# Patient Record
Sex: FEMALE | Race: BLACK OR AFRICAN AMERICAN | NOT HISPANIC OR LATINO | URBAN - METROPOLITAN AREA
[De-identification: names, ages, dates, MRNs, and addresses within clinical notes are randomized per-mention and may not be internally consistent; named-entity substitution may affect disease eponyms.]

---

## 2017-01-02 ENCOUNTER — EMERGENCY (EMERGENCY)
Facility: HOSPITAL | Age: 22
LOS: 1 days | Discharge: HOME | End: 2017-01-02
Attending: EMERGENCY MEDICINE | Admitting: EMERGENCY MEDICINE
Payer: MEDICAID

## 2017-01-02 VITALS
OXYGEN SATURATION: 100 % | RESPIRATION RATE: 16 BRPM | HEART RATE: 80 BPM | SYSTOLIC BLOOD PRESSURE: 126 MMHG | DIASTOLIC BLOOD PRESSURE: 60 MMHG | TEMPERATURE: 98 F

## 2017-01-02 LAB
APPEARANCE UR: CLEAR — SIGNIFICANT CHANGE UP
BILIRUB UR-MCNC: NEGATIVE — SIGNIFICANT CHANGE UP
BLOOD UR QL VISUAL: HIGH
COLOR SPEC: YELLOW — SIGNIFICANT CHANGE UP
GLUCOSE UR-MCNC: NEGATIVE — SIGNIFICANT CHANGE UP
KETONES UR-MCNC: NEGATIVE — SIGNIFICANT CHANGE UP
LEUKOCYTE ESTERASE UR-ACNC: NEGATIVE — SIGNIFICANT CHANGE UP
NITRITE UR-MCNC: NEGATIVE — SIGNIFICANT CHANGE UP
PH UR: 7 — SIGNIFICANT CHANGE UP (ref 4.6–8)
PROT UR-MCNC: NEGATIVE — SIGNIFICANT CHANGE UP
RBC CASTS # UR COMP ASSIST: SIGNIFICANT CHANGE UP (ref 0–?)
SP GR SPEC: 1.02 — SIGNIFICANT CHANGE UP (ref 1–1.03)
SQUAMOUS # UR AUTO: SIGNIFICANT CHANGE UP
UROBILINOGEN FLD QL: NORMAL E.U. — SIGNIFICANT CHANGE UP (ref 0.1–0.2)
WBC UR QL: SIGNIFICANT CHANGE UP (ref 0–?)

## 2017-01-02 PROCEDURE — 99283 EMERGENCY DEPT VISIT LOW MDM: CPT

## 2017-01-02 RX ORDER — MORPHINE SULFATE 50 MG/1
4 CAPSULE, EXTENDED RELEASE ORAL ONCE
Qty: 0 | Refills: 0 | Status: DISCONTINUED | OUTPATIENT
Start: 2017-01-02 | End: 2017-01-02

## 2017-01-02 NOTE — ED PROVIDER NOTE - OBJECTIVE STATEMENT
22 y/o F pt w/ PMHx of Left ovarian cyst c/o vomiting (nonbloody) x3 days and suprapubic pain (pressure-like), increased urinary frequency and malodorous urine since yesterday. No changes in bowel habits. Also reports 1 day of abnormal vaginal spotting. No hx of STDs. Denies fever, chills, lightheadedness, diarrhea, cough, CP, SOB, rash or any other complaints. NKDA. LMP 12/16/16.

## 2017-01-02 NOTE — ED PROVIDER NOTE - NS ED MD SCRIBE ATTENDING SCRIBE SECTIONS
HISTORY OF PRESENT ILLNESS/DISPOSITION/PHYSICAL EXAM/VITAL SIGNS( Pullset)/PAST MEDICAL/SURGICAL/SOCIAL HISTORY/HIV/REVIEW OF SYSTEMS

## 2017-01-02 NOTE — ED PROVIDER NOTE - CHPI ED SYMPTOMS POS
VOMITING/suprapubic pain , increased urinary frequency , malodorous urine  and vaginal spotting/PAIN

## 2017-01-02 NOTE — ED PROVIDER NOTE - PROGRESS NOTE DETAILS
Pt reports feeling improved. Serial abd exam now benign.  Test results and dispo plan reviewed w/ patient.

## 2017-01-02 NOTE — ED ADULT TRIAGE NOTE - CHIEF COMPLAINT QUOTE
c/o lower abdominal pain with nausea and vomiting, since yesterday with vaginal spotting and now bleeding. Denies any diarrhea or fever. LMP:12/19/16.  PMH: ovarian cyst

## 2017-08-10 NOTE — ED PROVIDER NOTE - CPE EDP EYES NORM
August 10, 2017        Rose Conrad  [address]  [facility] [address]      Dear Rose,    I am writing to give you the results of recent testing that you had done. In this letter, the word normal means that the test result was acceptable and does not indicate any problem or illness. The word abnormal means that the test does not fit within the normal range and may indicate a problem.   Test results within acceptable range   Your cholesterol test shows borderline high cholesterol. Recommend to follow low fat diet, regular exercise and wt loss.         Return to clinic in  6 months retest fasting blood sample prior to appointment        If you have any further questions please call me at [phone].     Sincerely,      Nellie Holliday MD  [facility]  Internal Medicine  [facility]  [address] [place]  [address] [phone]    [employer] is a powerful new internet tool that is quick, convenient and confidential.  With [employer], you can view your results faster; communicate online with your [facility] physician's office, schedule many types of appointments, and find helpful healthcare links.    Visit [email or web address] soon and often.  Sign up, take a quick tour, view important information, and stay in touch with [facility].  We're there when you need us.     normal...

## 2018-09-06 ENCOUNTER — EMERGENCY (EMERGENCY)
Facility: HOSPITAL | Age: 23
LOS: 0 days | Discharge: HOME | End: 2018-09-06
Attending: EMERGENCY MEDICINE
Payer: MEDICAID

## 2018-09-06 VITALS
DIASTOLIC BLOOD PRESSURE: 76 MMHG | HEIGHT: 63 IN | HEART RATE: 105 BPM | OXYGEN SATURATION: 100 % | RESPIRATION RATE: 18 BRPM | TEMPERATURE: 103 F | SYSTOLIC BLOOD PRESSURE: 103 MMHG | WEIGHT: 233.91 LBS

## 2018-09-06 VITALS
TEMPERATURE: 98 F | DIASTOLIC BLOOD PRESSURE: 55 MMHG | HEART RATE: 75 BPM | RESPIRATION RATE: 18 BRPM | SYSTOLIC BLOOD PRESSURE: 121 MMHG | OXYGEN SATURATION: 100 %

## 2018-09-06 DIAGNOSIS — R50.9 FEVER, UNSPECIFIED: ICD-10-CM

## 2018-09-06 DIAGNOSIS — N39.0 URINARY TRACT INFECTION, SITE NOT SPECIFIED: ICD-10-CM

## 2018-09-06 DIAGNOSIS — R11.2 NAUSEA WITH VOMITING, UNSPECIFIED: ICD-10-CM

## 2018-09-06 PROBLEM — N83.202 UNSPECIFIED OVARIAN CYST, LEFT SIDE: Chronic | Status: ACTIVE | Noted: 2017-01-02

## 2018-09-06 LAB
ALBUMIN SERPL ELPH-MCNC: 2.5 G/DL — LOW (ref 3.3–5)
ALP SERPL-CCNC: 79 U/L — SIGNIFICANT CHANGE UP (ref 40–120)
ALT FLD-CCNC: 13 U/L — SIGNIFICANT CHANGE UP (ref 12–78)
ANION GAP SERPL CALC-SCNC: 8 MMOL/L — SIGNIFICANT CHANGE UP (ref 5–17)
APPEARANCE UR: ABNORMAL
AST SERPL-CCNC: 10 U/L — LOW (ref 15–37)
BILIRUB SERPL-MCNC: 0.5 MG/DL — SIGNIFICANT CHANGE UP (ref 0.2–1.2)
BILIRUB UR-MCNC: NEGATIVE — SIGNIFICANT CHANGE UP
BUN SERPL-MCNC: 7 MG/DL — SIGNIFICANT CHANGE UP (ref 7–23)
CALCIUM SERPL-MCNC: 8 MG/DL — LOW (ref 8.5–10.1)
CHLORIDE SERPL-SCNC: 107 MMOL/L — SIGNIFICANT CHANGE UP (ref 96–108)
CO2 SERPL-SCNC: 29 MMOL/L — SIGNIFICANT CHANGE UP (ref 22–31)
COLOR SPEC: YELLOW — SIGNIFICANT CHANGE UP
CREAT SERPL-MCNC: 0.69 MG/DL — SIGNIFICANT CHANGE UP (ref 0.5–1.3)
DIFF PNL FLD: ABNORMAL
GLUCOSE SERPL-MCNC: 130 MG/DL — HIGH (ref 70–99)
GLUCOSE UR QL: NEGATIVE MG/DL — SIGNIFICANT CHANGE UP
HCG SERPL-ACNC: <1 MIU/ML — SIGNIFICANT CHANGE UP
HCT VFR BLD CALC: 31.3 % — LOW (ref 34.5–45)
HGB BLD-MCNC: 9.6 G/DL — LOW (ref 11.5–15.5)
KETONES UR-MCNC: NEGATIVE — SIGNIFICANT CHANGE UP
LACTATE SERPL-SCNC: 0.9 MMOL/L — SIGNIFICANT CHANGE UP (ref 0.7–2)
LACTATE SERPL-SCNC: 3.3 MMOL/L — HIGH (ref 0.7–2)
LEUKOCYTE ESTERASE UR-ACNC: ABNORMAL
MAGNESIUM SERPL-MCNC: 1.9 MG/DL — SIGNIFICANT CHANGE UP (ref 1.6–2.6)
MCHC RBC-ENTMCNC: 24.7 PG — LOW (ref 27–34)
MCHC RBC-ENTMCNC: 30.7 GM/DL — LOW (ref 32–36)
MCV RBC AUTO: 80.7 FL — SIGNIFICANT CHANGE UP (ref 80–100)
NITRITE UR-MCNC: POSITIVE
NRBC # BLD: 0 /100 WBCS — SIGNIFICANT CHANGE UP (ref 0–0)
PH UR: 7 — SIGNIFICANT CHANGE UP (ref 5–8)
PLATELET # BLD AUTO: 352 K/UL — SIGNIFICANT CHANGE UP (ref 150–400)
POTASSIUM SERPL-MCNC: 3.4 MMOL/L — LOW (ref 3.5–5.3)
POTASSIUM SERPL-SCNC: 3.4 MMOL/L — LOW (ref 3.5–5.3)
PROT SERPL-MCNC: 7.3 GM/DL — SIGNIFICANT CHANGE UP (ref 6–8.3)
PROT UR-MCNC: 15 MG/DL
RBC # BLD: 3.88 M/UL — SIGNIFICANT CHANGE UP (ref 3.8–5.2)
RBC # FLD: 16.2 % — HIGH (ref 10.3–14.5)
SODIUM SERPL-SCNC: 144 MMOL/L — SIGNIFICANT CHANGE UP (ref 135–145)
SP GR SPEC: 1 — LOW (ref 1.01–1.02)
UROBILINOGEN FLD QL: 1 MG/DL
WBC # BLD: 9.02 K/UL — SIGNIFICANT CHANGE UP (ref 3.8–10.5)
WBC # FLD AUTO: 9.02 K/UL — SIGNIFICANT CHANGE UP (ref 3.8–10.5)

## 2018-09-06 PROCEDURE — 76830 TRANSVAGINAL US NON-OB: CPT | Mod: 26

## 2018-09-06 PROCEDURE — 99284 EMERGENCY DEPT VISIT MOD MDM: CPT | Mod: 25

## 2018-09-06 PROCEDURE — 76775 US EXAM ABDO BACK WALL LIM: CPT | Mod: 26

## 2018-09-06 RX ORDER — CIPROFLOXACIN LACTATE 400MG/40ML
500 VIAL (ML) INTRAVENOUS ONCE
Qty: 0 | Refills: 0 | Status: COMPLETED | OUTPATIENT
Start: 2018-09-06 | End: 2018-09-06

## 2018-09-06 RX ORDER — ACETAMINOPHEN 500 MG
975 TABLET ORAL ONCE
Qty: 0 | Refills: 0 | Status: COMPLETED | OUTPATIENT
Start: 2018-09-06 | End: 2018-09-06

## 2018-09-06 RX ORDER — IBUPROFEN 200 MG
1 TABLET ORAL
Qty: 20 | Refills: 0
Start: 2018-09-06 | End: 2018-09-10

## 2018-09-06 RX ORDER — FAMOTIDINE 10 MG/ML
20 INJECTION INTRAVENOUS ONCE
Qty: 0 | Refills: 0 | Status: COMPLETED | OUTPATIENT
Start: 2018-09-06 | End: 2018-09-06

## 2018-09-06 RX ORDER — PHENAZOPYRIDINE HCL 100 MG
1 TABLET ORAL
Qty: 6 | Refills: 0
Start: 2018-09-06 | End: 2018-09-07

## 2018-09-06 RX ORDER — METOCLOPRAMIDE HCL 10 MG
10 TABLET ORAL ONCE
Qty: 0 | Refills: 0 | Status: COMPLETED | OUTPATIENT
Start: 2018-09-06 | End: 2018-09-06

## 2018-09-06 RX ORDER — FAMOTIDINE 10 MG/ML
20 INJECTION INTRAVENOUS ONCE
Qty: 0 | Refills: 0 | Status: DISCONTINUED | OUTPATIENT
Start: 2018-09-06 | End: 2018-09-06

## 2018-09-06 RX ORDER — SODIUM CHLORIDE 9 MG/ML
1000 INJECTION INTRAMUSCULAR; INTRAVENOUS; SUBCUTANEOUS ONCE
Qty: 0 | Refills: 0 | Status: COMPLETED | OUTPATIENT
Start: 2018-09-06 | End: 2018-09-06

## 2018-09-06 RX ORDER — MOXIFLOXACIN HYDROCHLORIDE TABLETS, 400 MG 400 MG/1
1 TABLET, FILM COATED ORAL
Qty: 20 | Refills: 0
Start: 2018-09-06 | End: 2018-09-15

## 2018-09-06 RX ADMIN — FAMOTIDINE 20 MILLIGRAM(S): 10 INJECTION INTRAVENOUS at 08:46

## 2018-09-06 RX ADMIN — FAMOTIDINE 104 MILLIGRAM(S): 10 INJECTION INTRAVENOUS at 08:16

## 2018-09-06 RX ADMIN — Medication 975 MILLIGRAM(S): at 08:15

## 2018-09-06 RX ADMIN — Medication 500 MILLIGRAM(S): at 15:51

## 2018-09-06 RX ADMIN — Medication 10 MILLIGRAM(S): at 11:31

## 2018-09-06 RX ADMIN — SODIUM CHLORIDE 1000 MILLILITER(S): 9 INJECTION INTRAMUSCULAR; INTRAVENOUS; SUBCUTANEOUS at 08:46

## 2018-09-06 RX ADMIN — SODIUM CHLORIDE 1000 MILLILITER(S): 9 INJECTION INTRAMUSCULAR; INTRAVENOUS; SUBCUTANEOUS at 08:16

## 2018-09-06 NOTE — ED PROVIDER NOTE - CARE PLAN
Principal Discharge DX:	Lower abdominal pain Principal Discharge DX:	Urinary tract infection without hematuria, site unspecified

## 2018-09-06 NOTE — ED PROVIDER NOTE - PHYSICAL EXAMINATION
Vitals: WNL  Gen: AAOx3, NAD, sitting comfortably in stretcher  Head: ncat, perrla, eomi b/l  Neck: supple, no lymphadenopathy, no midline deviation  Heart: rrr, no m/r/g  Lungs: CTA b/l, no rales/ronchi/wheezes  Abd: soft, suprapubic tenderness, non-distended, no rebound or guarding  Ext: no clubbing/cyanosis/edema  Neuro: sensation and muscle strength intact b/l, steady gait Vitals: WNL  Gen: AAOx3, NAD, sitting comfortably in stretcher  Head: ncat, perrla, eomi b/l  Neck: supple, no lymphadenopathy, no midline deviation  Heart: rrr, no m/r/g  Lungs: CTA b/l, no rales/ronchi/wheezes  Abd: soft, suprapubic tenderness, non-distended, no rebound or guarding  Ext: no clubbing/cyanosis/edema  Neuro: sensation and muscle strength intact b/l, steady gait  Pelvic: performed with PCT Guerline chaperone in ED; external genitalia normal, no lesions, vaginal canal has + dark blood pooling in vaginal vault, no discharge, normal cervix, no CMT, cervix closed, no adnexal tenderness, no masses palpated

## 2018-09-06 NOTE — ED PROVIDER NOTE - PROGRESS NOTE DETAILS
Results reported to patient--grossly benign, US and Labs WNL, + UTI on UA, likely complicated   Pt. reports feeling better after meds  pt. agrees to f/u with primary care outpt.  pt. understands to return to ED if symptoms worsen; will d/c with antbx

## 2018-09-06 NOTE — ED PROVIDER NOTE - OBJECTIVE STATEMENT
23 yo F with shaking chills on waking this morning.  Pt. reports nausea, 1 episode of vomiting after waking this morning.  She felt fine last night.  She denies sick contacts.  When asked, she admits to frequent BV, and suprapubic discomfort.  She notes her vaginal fluids have been different than normal.  When asked, she admits to back pain lately, no apparent reason.  No other complaints or associated symptoms.   ROS: negative for cough, headache, chest pain, shortness of breath, abd pain, nausea, vomiting, diarrhea, rash, paresthesia, and weakness--all other systems reviewed are negative.   PMH: BV; Meds: Denies; SH: Denies smoking/drinking/drug use, currently menstruating

## 2018-09-06 NOTE — ED ADULT NURSE NOTE - OBJECTIVE STATEMENT
Patient has abdominal pain, fever. pt states she was shaking and complains of lower back pain. Patient has abdominal pain, fever/chills, pt states she was shaking and complains of lower back pain. Hx of Bacterial vaginal cyst.

## 2018-09-06 NOTE — ED PROVIDER NOTE - MEDICAL DECISION MAKING DETAILS
21 yo F with lower abd pain, concerning for pregnancy, UTI, pyelo, renal stone, vaginal infection, viral gastroenteritis   -basic labs, mag, hcg, ua, cx, Tx pepcid, reglan, ns hydration, tylenol for fever

## 2019-04-24 PROBLEM — Z00.00 ENCOUNTER FOR PREVENTIVE HEALTH EXAMINATION: Status: ACTIVE | Noted: 2019-04-24

## 2019-05-31 ENCOUNTER — APPOINTMENT (OUTPATIENT)
Dept: SURGERY | Facility: CLINIC | Age: 24
End: 2019-05-31
Payer: MEDICAID

## 2019-05-31 VITALS
WEIGHT: 234 LBS | SYSTOLIC BLOOD PRESSURE: 122 MMHG | HEIGHT: 63 IN | TEMPERATURE: 98.2 F | DIASTOLIC BLOOD PRESSURE: 85 MMHG | OXYGEN SATURATION: 98 % | HEART RATE: 89 BPM | RESPIRATION RATE: 15 BRPM | BODY MASS INDEX: 41.46 KG/M2

## 2019-05-31 DIAGNOSIS — D64.9 ANEMIA, UNSPECIFIED: ICD-10-CM

## 2019-05-31 DIAGNOSIS — Z82.49 FAMILY HISTORY OF ISCHEMIC HEART DISEASE AND OTHER DISEASES OF THE CIRCULATORY SYSTEM: ICD-10-CM

## 2019-05-31 DIAGNOSIS — Z83.3 FAMILY HISTORY OF DIABETES MELLITUS: ICD-10-CM

## 2019-05-31 DIAGNOSIS — Z87.891 PERSONAL HISTORY OF NICOTINE DEPENDENCE: ICD-10-CM

## 2019-05-31 DIAGNOSIS — Z78.9 OTHER SPECIFIED HEALTH STATUS: ICD-10-CM

## 2019-05-31 PROCEDURE — 99205 OFFICE O/P NEW HI 60 MIN: CPT

## 2019-05-31 NOTE — ASSESSMENT
[FreeTextEntry1] : 23-year-old female followed with 3 234 pounds with a BMI of 41.5 she has been heavy most of her adult life she would like to be considered for a sleeve gastrectomy I believe she would be an excellent candidate she will undergo her usual medical nutritional and psychological workup attempted preoperative support group meeting and return for a second office visit one to lesion completely the risks benefits and expectations were discussed at length with the patient all of her questions were answered

## 2019-05-31 NOTE — CONSULT LETTER
[Dear  ___] : Dear  [unfilled], [Consult Letter:] : I had the pleasure of evaluating your patient, [unfilled]. [Please see my note below.] : Please see my note below. [Consult Closing:] : Thank you very much for allowing me to participate in the care of this patient.  If you have any questions, please do not hesitate to contact me. [Sincerely,] : Sincerely, [FreeTextEntry3] : Fabian Garcia MD, FACS, FASMBS\par Chief Division of Minimally Invasive Surgery\par Co-Director of Bariatric Surgery \par [facility]\[place] [address]

## 2019-05-31 NOTE — PHYSICAL EXAM
[Obese, well nourished, in no acute distress] : obese, well nourished, in no acute distress [Normal] : normal spine exam without palpable tenderness, no kyphosis or scoliosis

## 2019-05-31 NOTE — HISTORY OF PRESENT ILLNESS
[de-identified] : Sana is a 22 y/o female here for an initial consult for weight loss surgery.23-year-old female thought with 3 234 pounds BMI 41.5 she has been having most of her adult life she has been on multiple diet programs in the past closing up to 20 pounds of any one time only skin and that weight back she is looking for more permanent solution for her weight loss problem she is interested in a sleeve gastrectomy

## 2019-05-31 NOTE — REASON FOR VISIT
[Initial Consult] : an initial consult for [Morbid Obesity (BMI>40)] : morbid obesity (bmi>40) [FreeTextEntry2] : chief complaint morbid obesity

## 2019-06-14 ENCOUNTER — TRANSCRIPTION ENCOUNTER (OUTPATIENT)
Age: 24
End: 2019-06-14

## 2019-10-01 ENCOUNTER — APPOINTMENT (OUTPATIENT)
Dept: CARDIOLOGY | Facility: CLINIC | Age: 24
End: 2019-10-01
Payer: MEDICAID

## 2019-10-01 ENCOUNTER — NON-APPOINTMENT (OUTPATIENT)
Age: 24
End: 2019-10-01

## 2019-10-01 VITALS
SYSTOLIC BLOOD PRESSURE: 124 MMHG | HEART RATE: 80 BPM | HEIGHT: 63 IN | RESPIRATION RATE: 16 BRPM | BODY MASS INDEX: 41.11 KG/M2 | WEIGHT: 232 LBS | DIASTOLIC BLOOD PRESSURE: 72 MMHG

## 2019-10-01 PROCEDURE — 99204 OFFICE O/P NEW MOD 45 MIN: CPT

## 2019-10-01 PROCEDURE — 93000 ELECTROCARDIOGRAM COMPLETE: CPT

## 2019-10-03 ENCOUNTER — RESULT REVIEW (OUTPATIENT)
Age: 24
End: 2019-10-03

## 2019-10-04 RX ORDER — FERROUS SULFATE 325(65) MG
325 (65 FE) TABLET ORAL
Refills: 0 | Status: DISCONTINUED | COMMUNITY
End: 2019-10-04

## 2019-10-07 ENCOUNTER — APPOINTMENT (OUTPATIENT)
Dept: CARDIOLOGY | Facility: CLINIC | Age: 24
End: 2019-10-07
Payer: MEDICAID

## 2019-10-07 VITALS
HEIGHT: 63 IN | WEIGHT: 232 LBS | BODY MASS INDEX: 41.11 KG/M2 | RESPIRATION RATE: 15 BRPM | DIASTOLIC BLOOD PRESSURE: 80 MMHG | SYSTOLIC BLOOD PRESSURE: 128 MMHG | HEART RATE: 78 BPM

## 2019-10-07 DIAGNOSIS — R01.1 CARDIAC MURMUR, UNSPECIFIED: ICD-10-CM

## 2019-10-07 DIAGNOSIS — R06.09 OTHER FORMS OF DYSPNEA: ICD-10-CM

## 2019-10-07 DIAGNOSIS — Z01.818 ENCOUNTER FOR OTHER PREPROCEDURAL EXAMINATION: ICD-10-CM

## 2019-10-07 PROCEDURE — 99213 OFFICE O/P EST LOW 20 MIN: CPT | Mod: 25

## 2019-10-07 PROCEDURE — 93015 CV STRESS TEST SUPVJ I&R: CPT

## 2019-10-07 PROCEDURE — 93306 TTE W/DOPPLER COMPLETE: CPT

## 2019-10-07 PROCEDURE — ZZZZZ: CPT

## 2019-10-17 DIAGNOSIS — E55.9 VITAMIN D DEFICIENCY, UNSPECIFIED: ICD-10-CM

## 2019-10-17 DIAGNOSIS — E53.8 DEFICIENCY OF OTHER SPECIFIED B GROUP VITAMINS: ICD-10-CM

## 2019-10-17 DIAGNOSIS — E61.1 IRON DEFICIENCY: ICD-10-CM

## 2019-10-17 PROBLEM — R06.09 DYSPNEA ON EXERTION: Status: ACTIVE | Noted: 2019-10-01

## 2019-10-17 PROBLEM — R01.1 HEART MURMUR: Status: ACTIVE | Noted: 2019-10-01

## 2019-10-25 NOTE — ED ADULT NURSE NOTE - NSSISCREENINGQ4_ED_A_ED
Therapy Activity Session  Performed by Rehab Aide staff     MAP: Acute Mobility Activity Program activity plan was established by a licensed therapist and performed under the guidance of Nursing staff.      Pt seen on 3CD nursing unit                                                                                         PT Frequency Frequency Comments: X MTTh; MAP 1 Daily                                                                                  OT Frequency Frequency Comments: if (gap) MWF    SLP Frequency      Availability:  Attempted to work with patient this date, unable to due to  patient declining to participate following explaination of the benefits of activity.     Tolerance/Participation  Attempted, Not seen.     SESSION    Activities/Exercises/Mobility completed this session:  Physical Therapy Exercises    TEP Follow Up Needed: Yes  Therapy Extender Program Discipline: PT        PT Frequency: MAP 1 daily ([facility] [phone])     PT Task 1: BLE sitting or supine therex  PT Reps for Task 1: 10  PT Sets for Task 1: 2                                                             Occupational Therapy Exercises    TEP Follow Up Needed: Yes  Therapy Extender Program Discipline: PT                                                                   Speech Therapy Exercises    TEP Follow Up Needed: Yes                                                                          No

## 2019-11-07 ENCOUNTER — LABORATORY RESULT (OUTPATIENT)
Age: 24
End: 2019-11-07

## 2019-11-12 PROBLEM — E55.9 VITAMIN D DEFICIENCY: Status: ACTIVE | Noted: 2019-11-12

## 2019-11-12 PROBLEM — E61.1 LOW SERUM IRON: Status: ACTIVE | Noted: 2019-11-12

## 2019-11-12 PROBLEM — E53.8 LOW FOLATE: Status: ACTIVE | Noted: 2019-11-12

## 2019-11-12 PROBLEM — E53.8 LOW VITAMIN B12 LEVEL: Status: ACTIVE | Noted: 2019-11-12

## 2019-11-15 ENCOUNTER — APPOINTMENT (OUTPATIENT)
Dept: SURGERY | Facility: CLINIC | Age: 24
End: 2019-11-15
Payer: MEDICAID

## 2019-11-15 PROCEDURE — 99215 OFFICE O/P EST HI 40 MIN: CPT

## 2019-11-27 ENCOUNTER — OUTPATIENT (OUTPATIENT)
Dept: OUTPATIENT SERVICES | Facility: HOSPITAL | Age: 24
LOS: 1 days | End: 2019-11-27
Payer: MEDICAID

## 2019-11-27 VITALS
HEART RATE: 86 BPM | HEIGHT: 63 IN | SYSTOLIC BLOOD PRESSURE: 109 MMHG | WEIGHT: 231.04 LBS | RESPIRATION RATE: 18 BRPM | DIASTOLIC BLOOD PRESSURE: 72 MMHG | TEMPERATURE: 99 F | OXYGEN SATURATION: 98 %

## 2019-11-27 DIAGNOSIS — E66.01 MORBID (SEVERE) OBESITY DUE TO EXCESS CALORIES: ICD-10-CM

## 2019-11-27 DIAGNOSIS — D64.9 ANEMIA, UNSPECIFIED: ICD-10-CM

## 2019-11-27 DIAGNOSIS — Z29.9 ENCOUNTER FOR PROPHYLACTIC MEASURES, UNSPECIFIED: ICD-10-CM

## 2019-11-27 LAB
BLD GP AB SCN SERPL QL: NEGATIVE — SIGNIFICANT CHANGE UP
RH IG SCN BLD-IMP: POSITIVE — SIGNIFICANT CHANGE UP

## 2019-11-27 PROCEDURE — G0463: CPT

## 2019-11-27 PROCEDURE — 86900 BLOOD TYPING SEROLOGIC ABO: CPT

## 2019-11-27 PROCEDURE — 86850 RBC ANTIBODY SCREEN: CPT

## 2019-11-27 PROCEDURE — 86901 BLOOD TYPING SEROLOGIC RH(D): CPT

## 2019-11-27 RX ORDER — CEFAZOLIN SODIUM 1 G
2000 VIAL (EA) INJECTION ONCE
Refills: 0 | Status: DISCONTINUED | OUTPATIENT
Start: 2019-12-04 | End: 2019-12-05

## 2019-11-27 NOTE — H&P PST ADULT - NSICDXPROBLEM_GEN_ALL_CORE_FT
PROBLEM DIAGNOSES  Problem: Anemia  Assessment and Plan: Pt states taking oral vitamins. Last Hgb/Hct 10.6/36.1.    Problem: Morbid (severe) obesity due to excess calories  Assessment and Plan: Pt scheduled for sx on 12/4/2019. Surgical, chlorhexidine and incentive spirometry reviewed w/ pt. OR booking made aware of BMI. Message and email sent to Pharmacist Adia Fierro.    Problem: Need for prophylactic measure  Assessment and Plan: The Caprini score indicates this patient is at risk for a VTE event (score 3-5).  Most surgical patients in this group would benefit from pharmacologic prophylaxis.  The surgical team will determine the balance between VTE risk and bleeding risk

## 2019-11-27 NOTE — H&P PST ADULT - NS PRO FEM REPRO BREAST EXAM FREQ
Pt states cream was not covered under insurance. Can you send in something else that's similar.        fluocinonide (LIDEX) 0.05 % cream
monthly

## 2019-11-27 NOTE — H&P PST ADULT - NSICDXPASTMEDICALHX_GEN_ALL_CORE_FT
PAST MEDICAL HISTORY:  Anemia     Cyst of left ovary     Morbid (severe) obesity due to excess calories BMI 40.9

## 2019-11-27 NOTE — H&P PST ADULT - RS GEN PE MLT RESP DETAILS PC
Per Message from [facility] below Golytely has been ordered. For the patient preferred pharmacy.     Patient had no further questions.     ELECTRONICALLY SIGNED BY: Anny Hensley CMA  06/27/19     clear to auscultation bilaterally/respirations non-labored/breath sounds equal/normal

## 2019-11-27 NOTE — H&P PST ADULT - ASSESSMENT
CAPRINI SCORE [CLOT updated 18]    AGE RELATED RISK FACTORS                                                       MOBILITY RELATED FACTORS  [ ] Age 41-60 years                                            (1 Point)                    [ ] Bed rest                                                        (1 Point)  [ ] Age: 61-74 years                                           (2 Points)                  [ ] Plaster cast                                                   (2 Points)  [ ] Age= 75 years                                              (3 Points)                    [ ] Bed bound for more than 72 hours                 (2 Points)    DISEASE RELATED RISK FACTORS                                               GENDER SPECIFIC FACTORS  [ ] Edema in the lower extremities                       (1 Point)              [ ] Pregnancy                                                     (1 Point)  [ ] Varicose veins                                               (1 Point)                     [ ] Post-partum < 6 weeks                                   (1 Point)             [ ] BMI > 25 Kg/m2                                            (1 Point)                     [ ] Hormonal therapy  or oral contraception          (1 Point)                 [ ] Sepsis (in the previous month)                        (1 Point)               [ ] History of pregnancy complications                 (1 point)  [ ] Pneumonia or serious lung disease                                               [ ] Unexplained or recurrent                     (1 Point)           (in the previous month)                               (1 Point)  [ ] Abnormal pulmonary function test                     (1 Point)                 SURGERY RELATED RISK FACTORS  [ ] Acute myocardial infarction                              (1 Point)               [ ]  Section                                             (1 Point)  [ ] Congestive heart failure (in the previous month)  (1 Point)      [ ] Minor surgery                                                  (1 Point)   [ ] Inflammatory bowel disease                             (1 Point)               [ ] Arthroscopic surgery                                        (2 Points)  [ ] Central venous access                                      (2 Points)                [ ] General surgery lasting more than 45 minutes (2 points)  [ ] Present or previous malignancy                     (2 Points)                [ ] Elective arthroplasty                                         (5 points)    [ ] Stroke (in the previous month)                          (5 Points)                                                                                                                                                           HEMATOLOGY RELATED FACTORS                                                 TRAUMA RELATED RISK FACTORS  [ ] Prior episodes of VTE                                     (3 Points)                [ ] Fracture of the hip, pelvis, or leg                       (5 Points)  [ ] Positive family history for VTE                         (3 Points)             [ ] Acute spinal cord injury (in the previous month)  (5 Points)  [ ] Prothrombin 66046 A                                     (3 Points)               [ ] Paralysis  (less than 1 month)                             (5 Points)  [ ] Factor V Leiden                                             (3 Points)                  [ ] Multiple Trauma within 1 month                        (5 Points)  [ ] Lupus anticoagulants                                     (3 Points)                                                           [ ] Anticardiolipin antibodies                               (3 Points)                                                       [ ] High homocysteine in the blood                      (3 Points)                                             [ ] Other congenital or acquired thrombophilia      (3 Points)                                                [ ] Heparin induced thrombocytopenia                  (3 Points)                                     Total Score [ 3   ]

## 2019-12-02 NOTE — PHARMACOTHERAPY INTERVENTION NOTE - COMMENTS
Vertical sleeve gastrectomy scheduled for 12/4/2019.    Patient medication reconciliation completed. Patient currently taking:   -Folic acid 400 mcg tablet  -Ferrous sulfate 325 mg tablet  -Vitamin B-12 2000 mcg tablet  -Vitamin D3 1000 unit tablet    Patient was instructed to use crushed, dissolvable, chewable, or liquid formulations of medications for 1 month. Patient was informed to take daily multivitamins post surgically. Patient reeducated on NSAID avoidance (ibuprofen, ASA, naproxen, aleve) as they increased risk of GI bleeding; may use APAP for mild pain otherwise contact prescriber for consult. Patient was informed on indications and directions for administration for hyoscyamine SL, acetaminophen liquid, ondansetron ODT, and omeprazole DRMo Patient was instructed to take the medications as follows:  -Crush (ferrous sulfate, folic acid, vitamin b-12, vitamin D3)    Loan Trinh, PharmD  #[id number]

## 2019-12-04 ENCOUNTER — APPOINTMENT (OUTPATIENT)
Dept: SURGERY | Facility: HOSPITAL | Age: 24
End: 2019-12-04
Payer: MEDICAID

## 2019-12-04 ENCOUNTER — INPATIENT (INPATIENT)
Facility: HOSPITAL | Age: 24
LOS: 0 days | Discharge: HOME | DRG: 621 | End: 2019-12-05
Attending: SURGERY | Admitting: SURGERY
Payer: MEDICAID

## 2019-12-04 ENCOUNTER — RESULT REVIEW (OUTPATIENT)
Age: 24
End: 2019-12-04

## 2019-12-04 VITALS
TEMPERATURE: 98 F | DIASTOLIC BLOOD PRESSURE: 71 MMHG | SYSTOLIC BLOOD PRESSURE: 132 MMHG | HEART RATE: 93 BPM | HEIGHT: 63 IN | WEIGHT: 231.04 LBS | RESPIRATION RATE: 16 BRPM | OXYGEN SATURATION: 98 %

## 2019-12-04 DIAGNOSIS — E66.01 MORBID (SEVERE) OBESITY DUE TO EXCESS CALORIES: ICD-10-CM

## 2019-12-04 LAB
ANION GAP SERPL CALC-SCNC: 17 MMOL/L — SIGNIFICANT CHANGE UP (ref 5–17)
BASOPHILS # BLD AUTO: 0.04 K/UL — SIGNIFICANT CHANGE UP (ref 0–0.2)
BASOPHILS NFR BLD AUTO: 0.2 % — SIGNIFICANT CHANGE UP (ref 0–2)
BUN SERPL-MCNC: 7 MG/DL — SIGNIFICANT CHANGE UP (ref 7–23)
CALCIUM SERPL-MCNC: 9 MG/DL — SIGNIFICANT CHANGE UP (ref 8.4–10.5)
CHLORIDE SERPL-SCNC: 99 MMOL/L — SIGNIFICANT CHANGE UP (ref 96–108)
CO2 SERPL-SCNC: 21 MMOL/L — LOW (ref 22–31)
CREAT SERPL-MCNC: 0.59 MG/DL — SIGNIFICANT CHANGE UP (ref 0.5–1.3)
EOSINOPHIL # BLD AUTO: 0.05 K/UL — SIGNIFICANT CHANGE UP (ref 0–0.5)
EOSINOPHIL NFR BLD AUTO: 0.3 % — SIGNIFICANT CHANGE UP (ref 0–6)
GLUCOSE BLDC GLUCOMTR-MCNC: 91 MG/DL — SIGNIFICANT CHANGE UP (ref 70–99)
GLUCOSE SERPL-MCNC: 141 MG/DL — HIGH (ref 70–99)
HCG UR QL: NEGATIVE — SIGNIFICANT CHANGE UP
HCT VFR BLD CALC: 36.7 % — SIGNIFICANT CHANGE UP (ref 34.5–45)
HGB BLD-MCNC: 11.4 G/DL — LOW (ref 11.5–15.5)
IMM GRANULOCYTES NFR BLD AUTO: 0.6 % — SIGNIFICANT CHANGE UP (ref 0–1.5)
LYMPHOCYTES # BLD AUTO: 10.9 % — LOW (ref 13–44)
LYMPHOCYTES # BLD AUTO: 2.13 K/UL — SIGNIFICANT CHANGE UP (ref 1–3.3)
MAGNESIUM SERPL-MCNC: 1.9 MG/DL — SIGNIFICANT CHANGE UP (ref 1.6–2.6)
MCHC RBC-ENTMCNC: 25.1 PG — LOW (ref 27–34)
MCHC RBC-ENTMCNC: 31.1 GM/DL — LOW (ref 32–36)
MCV RBC AUTO: 80.7 FL — SIGNIFICANT CHANGE UP (ref 80–100)
MONOCYTES # BLD AUTO: 0.78 K/UL — SIGNIFICANT CHANGE UP (ref 0–0.9)
MONOCYTES NFR BLD AUTO: 4 % — SIGNIFICANT CHANGE UP (ref 2–14)
NEUTROPHILS # BLD AUTO: 16.51 K/UL — HIGH (ref 1.8–7.4)
NEUTROPHILS NFR BLD AUTO: 84 % — HIGH (ref 43–77)
NRBC # BLD: 0 /100 WBCS — SIGNIFICANT CHANGE UP (ref 0–0)
PHOSPHATE SERPL-MCNC: 3.7 MG/DL — SIGNIFICANT CHANGE UP (ref 2.5–4.5)
PLATELET # BLD AUTO: 345 K/UL — SIGNIFICANT CHANGE UP (ref 150–400)
POTASSIUM SERPL-MCNC: 3.9 MMOL/L — SIGNIFICANT CHANGE UP (ref 3.5–5.3)
POTASSIUM SERPL-SCNC: 3.9 MMOL/L — SIGNIFICANT CHANGE UP (ref 3.5–5.3)
RBC # BLD: 4.55 M/UL — SIGNIFICANT CHANGE UP (ref 3.8–5.2)
RBC # FLD: 18.1 % — HIGH (ref 10.3–14.5)
RH IG SCN BLD-IMP: POSITIVE — SIGNIFICANT CHANGE UP
SODIUM SERPL-SCNC: 137 MMOL/L — SIGNIFICANT CHANGE UP (ref 135–145)
WBC # BLD: 19.62 K/UL — HIGH (ref 3.8–10.5)
WBC # FLD AUTO: 19.62 K/UL — HIGH (ref 3.8–10.5)

## 2019-12-04 PROCEDURE — 43775 LAP SLEEVE GASTRECTOMY: CPT

## 2019-12-04 PROCEDURE — 88305 TISSUE EXAM BY PATHOLOGIST: CPT | Mod: 26

## 2019-12-04 RX ORDER — FERROUS SULFATE 325(65) MG
1 TABLET ORAL
Qty: 0 | Refills: 0 | DISCHARGE

## 2019-12-04 RX ORDER — SODIUM CHLORIDE 9 MG/ML
1000 INJECTION, SOLUTION INTRAVENOUS
Refills: 0 | Status: DISCONTINUED | OUTPATIENT
Start: 2019-12-04 | End: 2019-12-05

## 2019-12-04 RX ORDER — ONDANSETRON 8 MG/1
4 TABLET, FILM COATED ORAL EVERY 6 HOURS
Refills: 0 | Status: DISCONTINUED | OUTPATIENT
Start: 2019-12-04 | End: 2019-12-05

## 2019-12-04 RX ORDER — HEPARIN SODIUM 5000 [USP'U]/ML
5000 INJECTION INTRAVENOUS; SUBCUTANEOUS EVERY 8 HOURS
Refills: 0 | Status: DISCONTINUED | OUTPATIENT
Start: 2019-12-04 | End: 2019-12-05

## 2019-12-04 RX ORDER — KETOROLAC TROMETHAMINE 30 MG/ML
30 SYRINGE (ML) INJECTION EVERY 6 HOURS
Refills: 0 | Status: DISCONTINUED | OUTPATIENT
Start: 2019-12-04 | End: 2019-12-05

## 2019-12-04 RX ORDER — FENTANYL CITRATE 50 UG/ML
50 INJECTION INTRAVENOUS
Refills: 0 | Status: DISCONTINUED | OUTPATIENT
Start: 2019-12-04 | End: 2019-12-04

## 2019-12-04 RX ORDER — PANTOPRAZOLE SODIUM 20 MG/1
40 TABLET, DELAYED RELEASE ORAL EVERY 24 HOURS
Refills: 0 | Status: DISCONTINUED | OUTPATIENT
Start: 2019-12-04 | End: 2019-12-05

## 2019-12-04 RX ORDER — ACETAMINOPHEN 500 MG
1000 TABLET ORAL ONCE
Refills: 0 | Status: COMPLETED | OUTPATIENT
Start: 2019-12-04 | End: 2019-12-04

## 2019-12-04 RX ORDER — HEPARIN SODIUM 5000 [USP'U]/ML
5000 INJECTION INTRAVENOUS; SUBCUTANEOUS ONCE
Refills: 0 | Status: COMPLETED | OUTPATIENT
Start: 2019-12-04 | End: 2019-12-04

## 2019-12-04 RX ORDER — FOLIC ACID 0.8 MG
1 TABLET ORAL
Qty: 0 | Refills: 0 | DISCHARGE

## 2019-12-04 RX ORDER — LIDOCAINE HCL 20 MG/ML
0.2 VIAL (ML) INJECTION ONCE
Refills: 0 | Status: DISCONTINUED | OUTPATIENT
Start: 2019-12-04 | End: 2019-12-04

## 2019-12-04 RX ORDER — CEFAZOLIN SODIUM 1 G
2000 VIAL (EA) INJECTION EVERY 8 HOURS
Refills: 0 | Status: COMPLETED | OUTPATIENT
Start: 2019-12-04 | End: 2019-12-04

## 2019-12-04 RX ORDER — HYOSCYAMINE SULFATE 0.13 MG
0.12 TABLET ORAL EVERY 6 HOURS
Refills: 0 | Status: DISCONTINUED | OUTPATIENT
Start: 2019-12-04 | End: 2019-12-05

## 2019-12-04 RX ORDER — CHOLECALCIFEROL (VITAMIN D3) 125 MCG
1 CAPSULE ORAL
Qty: 0 | Refills: 0 | DISCHARGE

## 2019-12-04 RX ORDER — CHLORHEXIDINE GLUCONATE 213 G/1000ML
1 SOLUTION TOPICAL ONCE
Refills: 0 | Status: DISCONTINUED | OUTPATIENT
Start: 2019-12-04 | End: 2019-12-04

## 2019-12-04 RX ORDER — ACETAMINOPHEN 500 MG
1000 TABLET ORAL ONCE
Refills: 0 | Status: COMPLETED | OUTPATIENT
Start: 2019-12-05 | End: 2019-12-05

## 2019-12-04 RX ORDER — SODIUM CHLORIDE 9 MG/ML
3 INJECTION INTRAMUSCULAR; INTRAVENOUS; SUBCUTANEOUS EVERY 8 HOURS
Refills: 0 | Status: DISCONTINUED | OUTPATIENT
Start: 2019-12-04 | End: 2019-12-04

## 2019-12-04 RX ORDER — PREGABALIN 225 MG/1
2000 CAPSULE ORAL
Qty: 0 | Refills: 0 | DISCHARGE

## 2019-12-04 RX ADMIN — PANTOPRAZOLE SODIUM 40 MILLIGRAM(S): 20 TABLET, DELAYED RELEASE ORAL at 10:02

## 2019-12-04 RX ADMIN — HEPARIN SODIUM 5000 UNIT(S): 5000 INJECTION INTRAVENOUS; SUBCUTANEOUS at 13:23

## 2019-12-04 RX ADMIN — ONDANSETRON 4 MILLIGRAM(S): 8 TABLET, FILM COATED ORAL at 10:34

## 2019-12-04 RX ADMIN — SODIUM CHLORIDE 150 MILLILITER(S): 9 INJECTION, SOLUTION INTRAVENOUS at 17:12

## 2019-12-04 RX ADMIN — Medication 400 MILLIGRAM(S): at 20:08

## 2019-12-04 RX ADMIN — ONDANSETRON 4 MILLIGRAM(S): 8 TABLET, FILM COATED ORAL at 17:12

## 2019-12-04 RX ADMIN — ONDANSETRON 4 MILLIGRAM(S): 8 TABLET, FILM COATED ORAL at 23:19

## 2019-12-04 RX ADMIN — Medication 30 MILLIGRAM(S): at 14:30

## 2019-12-04 RX ADMIN — HEPARIN SODIUM 5000 UNIT(S): 5000 INJECTION INTRAVENOUS; SUBCUTANEOUS at 06:11

## 2019-12-04 RX ADMIN — FENTANYL CITRATE 50 MICROGRAM(S): 50 INJECTION INTRAVENOUS at 09:45

## 2019-12-04 RX ADMIN — SODIUM CHLORIDE 250 MILLILITER(S): 9 INJECTION, SOLUTION INTRAVENOUS at 12:25

## 2019-12-04 RX ADMIN — Medication 100 MILLIGRAM(S): at 17:12

## 2019-12-04 RX ADMIN — HEPARIN SODIUM 5000 UNIT(S): 5000 INJECTION INTRAVENOUS; SUBCUTANEOUS at 23:19

## 2019-12-04 RX ADMIN — FENTANYL CITRATE 50 MICROGRAM(S): 50 INJECTION INTRAVENOUS at 09:30

## 2019-12-04 RX ADMIN — Medication 0.12 MILLIGRAM(S): at 23:18

## 2019-12-04 RX ADMIN — Medication 100 MILLIGRAM(S): at 23:19

## 2019-12-04 RX ADMIN — Medication 1000 MILLIGRAM(S): at 20:40

## 2019-12-04 RX ADMIN — Medication 30 MILLIGRAM(S): at 20:08

## 2019-12-04 RX ADMIN — Medication 30 MILLIGRAM(S): at 20:40

## 2019-12-04 RX ADMIN — Medication 1000 MILLIGRAM(S): at 14:30

## 2019-12-04 RX ADMIN — Medication 400 MILLIGRAM(S): at 14:01

## 2019-12-04 RX ADMIN — Medication 30 MILLIGRAM(S): at 14:02

## 2019-12-04 RX ADMIN — SODIUM CHLORIDE 150 MILLILITER(S): 9 INJECTION, SOLUTION INTRAVENOUS at 09:42

## 2019-12-04 NOTE — CHART NOTE - NSCHARTNOTEFT_GEN_A_CORE
STATUS POST: laparoscopic vertical sleeve gastrectomy    POST OPERATIVE DAY #: 0    SUBJECTIVE: 23 yo F with PMHx of anemia and obesity presents for laparoscopic vertical sleeve gastrectomy. Pt recovering appropriately. Pt experienced 4 episodes of vomiting after the procedure associated with nausea and epigastric abd pain. Has not had a bowel movement and has not passed gas. Pt is ambulating and oob. Denies CP, SOB.       Vital Signs Last 24 Hrs  T(C): 37.1 (04 Dec 2019 17:10), Max: 37.1 (04 Dec 2019 17:10)  T(F): 98.7 (04 Dec 2019 17:10), Max: 98.7 (04 Dec 2019 17:10)  HR: 100 (04 Dec 2019 17:10) (87 - 102)  BP: 129/80 (04 Dec 2019 17:10) (128/79 - 150/87)  BP(mean): 89 (04 Dec 2019 13:00) (89 - 112)  RR: 18 (04 Dec 2019 17:10) (16 - 23)  SpO2: 100% (04 Dec 2019 17:10) (96% - 100%)  I&O's Summary    04 Dec 2019 07:01  -  04 Dec 2019 17:20  --------------------------------------------------------  IN: 700 mL / OUT: 500 mL / NET: 200 mL      I&O's Detail    04 Dec 2019 07:01  -  04 Dec 2019 17:20  --------------------------------------------------------  IN:    lactated ringers.: 450 mL    multivitamin/thiamine/folic acid in sodium chloride 0.9%: 250 mL  Total IN: 700 mL    OUT:    Estimated Blood Loss: 200 mL    Voided: 300 mL  Total OUT: 500 mL    Total NET: 200 mL          MEDICATIONS  (STANDING):  acetaminophen  IVPB .. 1000 milliGRAM(s) IV Intermittent once  ceFAZolin   IVPB 2000 milliGRAM(s) IV Intermittent every 8 hours  ceFAZolin   IVPB 2000 milliGRAM(s) IV Intermittent once  heparin  Injectable 5000 Unit(s) SubCutaneous every 8 hours  hyoscyamine SL 0.125 milliGRAM(s) SubLingual every 6 hours  ketorolac   Injectable 30 milliGRAM(s) IV Push every 6 hours  lactated ringers. 1000 milliLiter(s) (150 mL/Hr) IV Continuous <Continuous>  multivitamin/thiamine/folic acid in sodium chloride 0.9% 1000 milliLiter(s) (250 mL/Hr) IV Continuous <Continuous>  ondansetron Injectable 4 milliGRAM(s) IV Push every 6 hours  pantoprazole  Injectable 40 milliGRAM(s) IV Push every 24 hours    MEDICATIONS  (PRN):      LABS:                        11.4   19.62 )-----------( 345      ( 04 Dec 2019 09:45 )             36.7     12-04    137  |  99  |  7   ----------------------------<  141<H>  3.9   |  21<L>  |  0.59    Ca    9.0      04 Dec 2019 09:45  Phos  3.7     12-04  Mg     1.9     12-04        RADIOLOGY & ADDITIONAL STUDIES:    PHYSICAL EXAM:    General: NAD, resting in bed  Respiratory: nonlabored breathing, no acute distress  Cardiovascular: NSR  Gastrointestinal: soft, ND/NT, steri strips placed and clean / dry / intact  Extremities: warm and well-perfused      A/P: 24y Female Morbid or severe obesity due to excess calories  FH: type 2 diabetes mellitus  Family hx of hypertension  Handoff  MEWS Score  Anemia  Morbid (severe) obesity due to excess calories  Cyst of left ovary  Cyst of left ovary  Morbid obesity due to excess calories  Morbid obesity due to excess calories  Anemia  Morbid (severe) obesity due to excess calories  Need for prophylactic measure  Laparoscopic sleeve gastrectomy  No significant past surgical history  No significant past surgical history  MORBID (SEVERE) OBESITY DUE TO    Assessment: 23 yo F with h/o anemia and morbid obesity s/p laparoscopic vertical sleeve gastrectomy.      Plan:    - ERAS  - Mahad clears  - Pain control as needed   - DVT ppx  - OOB and ambulation as tolerated  - F/u AM labs STATUS POST: laparoscopic vertical sleeve gastrectomy    POST OPERATIVE DAY #: 0    SUBJECTIVE: Pt seen and examined at bedside. Pt was laying in bed comfortably. Patient states she had 4 episodes of vomiting after the procedure associated with nausea and epigastric abd pain. She is still currently nauseous. She has not had a bowel movement and has not passed gas. Pt is ambulating and oob. Denies fevers, chills, CP, SOB.       Vital Signs Last 24 Hrs  T(C): 37.1 (04 Dec 2019 17:10), Max: 37.1 (04 Dec 2019 17:10)  T(F): 98.7 (04 Dec 2019 17:10), Max: 98.7 (04 Dec 2019 17:10)  HR: 100 (04 Dec 2019 17:10) (87 - 102)  BP: 129/80 (04 Dec 2019 17:10) (128/79 - 150/87)  BP(mean): 89 (04 Dec 2019 13:00) (89 - 112)  RR: 18 (04 Dec 2019 17:10) (16 - 23)  SpO2: 100% (04 Dec 2019 17:10) (96% - 100%)  I&O's Summary    04 Dec 2019 07:01  -  04 Dec 2019 17:20  --------------------------------------------------------  IN: 700 mL / OUT: 500 mL / NET: 200 mL      I&O's Detail    04 Dec 2019 07:01  -  04 Dec 2019 17:20  --------------------------------------------------------  IN:    lactated ringers.: 450 mL    multivitamin/thiamine/folic acid in sodium chloride 0.9%: 250 mL  Total IN: 700 mL    OUT:    Estimated Blood Loss: 200 mL    Voided: 300 mL  Total OUT: 500 mL    Total NET: 200 mL      MEDICATIONS  (STANDING):  acetaminophen  IVPB .. 1000 milliGRAM(s) IV Intermittent once  ceFAZolin   IVPB 2000 milliGRAM(s) IV Intermittent every 8 hours  ceFAZolin   IVPB 2000 milliGRAM(s) IV Intermittent once  heparin  Injectable 5000 Unit(s) SubCutaneous every 8 hours  hyoscyamine SL 0.125 milliGRAM(s) SubLingual every 6 hours  ketorolac   Injectable 30 milliGRAM(s) IV Push every 6 hours  lactated ringers. 1000 milliLiter(s) (150 mL/Hr) IV Continuous <Continuous>  multivitamin/thiamine/folic acid in sodium chloride 0.9% 1000 milliLiter(s) (250 mL/Hr) IV Continuous <Continuous>  ondansetron Injectable 4 milliGRAM(s) IV Push every 6 hours  pantoprazole  Injectable 40 milliGRAM(s) IV Push every 24 hours    MEDICATIONS  (PRN):      LABS:                        11.4   19.62 )-----------( 345      ( 04 Dec 2019 09:45 )             36.7     12-04    137  |  99  |  7   ----------------------------<  141<H>  3.9   |  21<L>  |  0.59    Ca    9.0      04 Dec 2019 09:45  Phos  3.7     12-04  Mg     1.9     12-04        RADIOLOGY & ADDITIONAL STUDIES:    PHYSICAL EXAM:    General: NAD, a&ox3  Respiratory: nonlabored breathing, no acute distress  Cardiovascular: NSR  Gastrointestinal: soft, non-tender, non-distended, steri strips in place clean / dry / intact  Extremities: warm and well-perfused, +pulses b/l      Assessment: 23 yo F s/p laparoscopic vertical sleeve gastrectomy. She is still having nausea but otherwise her pain is controlled     Plan:    - ERAS protocol   -Hold tg clears until tomorrow due to nausea  -IVF   - Pain control   - DVT ppx  - OOB and ambulation as tolerated  - F/u AM labs

## 2019-12-05 ENCOUNTER — TRANSCRIPTION ENCOUNTER (OUTPATIENT)
Age: 24
End: 2019-12-05

## 2019-12-05 VITALS
SYSTOLIC BLOOD PRESSURE: 131 MMHG | HEART RATE: 77 BPM | DIASTOLIC BLOOD PRESSURE: 82 MMHG | RESPIRATION RATE: 18 BRPM | TEMPERATURE: 98 F | OXYGEN SATURATION: 98 %

## 2019-12-05 LAB
ANION GAP SERPL CALC-SCNC: 15 MMOL/L — SIGNIFICANT CHANGE UP (ref 5–17)
BASOPHILS # BLD AUTO: 0.01 K/UL — SIGNIFICANT CHANGE UP (ref 0–0.2)
BASOPHILS NFR BLD AUTO: 0.1 % — SIGNIFICANT CHANGE UP (ref 0–2)
BUN SERPL-MCNC: 5 MG/DL — LOW (ref 7–23)
CALCIUM SERPL-MCNC: 9.3 MG/DL — SIGNIFICANT CHANGE UP (ref 8.4–10.5)
CHLORIDE SERPL-SCNC: 99 MMOL/L — SIGNIFICANT CHANGE UP (ref 96–108)
CO2 SERPL-SCNC: 24 MMOL/L — SIGNIFICANT CHANGE UP (ref 22–31)
CREAT SERPL-MCNC: 0.62 MG/DL — SIGNIFICANT CHANGE UP (ref 0.5–1.3)
EOSINOPHIL # BLD AUTO: 0.18 K/UL — SIGNIFICANT CHANGE UP (ref 0–0.5)
EOSINOPHIL NFR BLD AUTO: 1.7 % — SIGNIFICANT CHANGE UP (ref 0–6)
GLUCOSE SERPL-MCNC: 93 MG/DL — SIGNIFICANT CHANGE UP (ref 70–99)
HCT VFR BLD CALC: 32.2 % — LOW (ref 34.5–45)
HGB BLD-MCNC: 10 G/DL — LOW (ref 11.5–15.5)
IMM GRANULOCYTES NFR BLD AUTO: 0.4 % — SIGNIFICANT CHANGE UP (ref 0–1.5)
LYMPHOCYTES # BLD AUTO: 1.32 K/UL — SIGNIFICANT CHANGE UP (ref 1–3.3)
LYMPHOCYTES # BLD AUTO: 12.6 % — LOW (ref 13–44)
MCHC RBC-ENTMCNC: 24.8 PG — LOW (ref 27–34)
MCHC RBC-ENTMCNC: 31.1 GM/DL — LOW (ref 32–36)
MCV RBC AUTO: 79.7 FL — LOW (ref 80–100)
MONOCYTES # BLD AUTO: 0.94 K/UL — HIGH (ref 0–0.9)
MONOCYTES NFR BLD AUTO: 9 % — SIGNIFICANT CHANGE UP (ref 2–14)
NEUTROPHILS # BLD AUTO: 7.97 K/UL — HIGH (ref 1.8–7.4)
NEUTROPHILS NFR BLD AUTO: 76.2 % — SIGNIFICANT CHANGE UP (ref 43–77)
NRBC # BLD: 0 /100 WBCS — SIGNIFICANT CHANGE UP (ref 0–0)
PLATELET # BLD AUTO: 345 K/UL — SIGNIFICANT CHANGE UP (ref 150–400)
POTASSIUM SERPL-MCNC: 4.1 MMOL/L — SIGNIFICANT CHANGE UP (ref 3.5–5.3)
POTASSIUM SERPL-SCNC: 4.1 MMOL/L — SIGNIFICANT CHANGE UP (ref 3.5–5.3)
RBC # BLD: 4.04 M/UL — SIGNIFICANT CHANGE UP (ref 3.8–5.2)
RBC # FLD: 18.1 % — HIGH (ref 10.3–14.5)
SODIUM SERPL-SCNC: 138 MMOL/L — SIGNIFICANT CHANGE UP (ref 135–145)
WBC # BLD: 10.46 K/UL — SIGNIFICANT CHANGE UP (ref 3.8–10.5)
WBC # FLD AUTO: 10.46 K/UL — SIGNIFICANT CHANGE UP (ref 3.8–10.5)

## 2019-12-05 PROCEDURE — 81025 URINE PREGNANCY TEST: CPT

## 2019-12-05 PROCEDURE — 99024 POSTOP FOLLOW-UP VISIT: CPT

## 2019-12-05 PROCEDURE — 83735 ASSAY OF MAGNESIUM: CPT

## 2019-12-05 PROCEDURE — C1889: CPT

## 2019-12-05 PROCEDURE — 84100 ASSAY OF PHOSPHORUS: CPT

## 2019-12-05 PROCEDURE — 85027 COMPLETE CBC AUTOMATED: CPT

## 2019-12-05 PROCEDURE — 88305 TISSUE EXAM BY PATHOLOGIST: CPT

## 2019-12-05 PROCEDURE — 80048 BASIC METABOLIC PNL TOTAL CA: CPT

## 2019-12-05 PROCEDURE — 82962 GLUCOSE BLOOD TEST: CPT

## 2019-12-05 RX ORDER — ENOXAPARIN SODIUM 100 MG/ML
40 INJECTION SUBCUTANEOUS
Qty: 560 | Refills: 0
Start: 2019-12-05 | End: 2019-12-18

## 2019-12-05 RX ORDER — ACETAMINOPHEN 500 MG
1000 TABLET ORAL ONCE
Refills: 0 | Status: COMPLETED | OUTPATIENT
Start: 2019-12-05 | End: 2019-12-05

## 2019-12-05 RX ORDER — OMEPRAZOLE 10 MG/1
1 CAPSULE, DELAYED RELEASE ORAL
Qty: 30 | Refills: 0
Start: 2019-12-05 | End: 2020-01-03

## 2019-12-05 RX ORDER — ONDANSETRON 8 MG/1
1 TABLET, FILM COATED ORAL
Qty: 21 | Refills: 0
Start: 2019-12-05 | End: 2019-12-11

## 2019-12-05 RX ORDER — HYOSCYAMINE SULFATE 0.13 MG
1 TABLET ORAL
Qty: 28 | Refills: 0
Start: 2019-12-05 | End: 2019-12-11

## 2019-12-05 RX ORDER — ACETAMINOPHEN 500 MG
15 TABLET ORAL
Qty: 300 | Refills: 0
Start: 2019-12-05 | End: 2019-12-09

## 2019-12-05 RX ADMIN — Medication 400 MILLIGRAM(S): at 11:44

## 2019-12-05 RX ADMIN — HEPARIN SODIUM 5000 UNIT(S): 5000 INJECTION INTRAVENOUS; SUBCUTANEOUS at 13:34

## 2019-12-05 RX ADMIN — Medication 0.12 MILLIGRAM(S): at 11:44

## 2019-12-05 RX ADMIN — Medication 400 MILLIGRAM(S): at 02:05

## 2019-12-05 RX ADMIN — Medication 30 MILLIGRAM(S): at 02:05

## 2019-12-05 RX ADMIN — ONDANSETRON 4 MILLIGRAM(S): 8 TABLET, FILM COATED ORAL at 05:17

## 2019-12-05 RX ADMIN — HEPARIN SODIUM 5000 UNIT(S): 5000 INJECTION INTRAVENOUS; SUBCUTANEOUS at 05:18

## 2019-12-05 RX ADMIN — Medication 0.12 MILLIGRAM(S): at 17:34

## 2019-12-05 RX ADMIN — PANTOPRAZOLE SODIUM 40 MILLIGRAM(S): 20 TABLET, DELAYED RELEASE ORAL at 09:14

## 2019-12-05 RX ADMIN — Medication 30 MILLIGRAM(S): at 09:08

## 2019-12-05 RX ADMIN — Medication 30 MILLIGRAM(S): at 02:35

## 2019-12-05 RX ADMIN — Medication 1000 MILLIGRAM(S): at 02:35

## 2019-12-05 RX ADMIN — ONDANSETRON 4 MILLIGRAM(S): 8 TABLET, FILM COATED ORAL at 11:45

## 2019-12-05 RX ADMIN — Medication 0.12 MILLIGRAM(S): at 05:17

## 2019-12-05 NOTE — DISCHARGE NOTE NURSING/CASE MANAGEMENT/SOCIAL WORK - NSDCPELOVENOX_GEN_ALL_CORE
Enoxaparin/Lovenox - Compliance/Enoxaparin/Lovenox - Dietary Advice/Enoxaparin/Lovenox - Follow up monitoring/Enoxaparin/Lovenox - Potential for adverse drug reactions and interactions

## 2019-12-05 NOTE — DISCHARGE NOTE PROVIDER - NSDCMRMEDTOKEN_GEN_ALL_CORE_FT
acetaminophen 500 mg/15 mL oral liquid: 15 milliliter(s) orally every 6 hours, As Needed   enoxaparin 40 mg/0.4 mL injectable solution: 40 milligram(s) subcutaneously once a day MDD:40mg.   ferrous sulfate 325 mg (65 mg elemental iron) oral tablet: 1 tab(s) orally once a day - crushable  folic acid 0.4 mg oral tablet: 1 tab(s) orally once a day - crushable  hyoscyamine 0.125 mg sublingual tablet: 1 tab(s) sublingual every 6 hours, As Needed MDD:4   omeprazole 40 mg oral delayed release capsule: 1 cap(s) orally once a day MDD:1 open and mix in applesauce  Vitamin B-12: 2000 microgram(s) tablet orally once a day - crushable  Vitamin D3 1000 intl units oral tablet: 1 tab(s) orally once a day - crushable  Zofran ODT 4 mg oral tablet, disintegratin tab(s) orally 3 times a day, As Needed MDD:3

## 2019-12-05 NOTE — DISCHARGE NOTE PROVIDER - CARE PROVIDER_API CALL
Fabian Garcia)  Surgery  [address], [address]  [place] [address]  Phone: [phone]  Fax: [phone]  Follow Up Time:

## 2019-12-05 NOTE — PROGRESS NOTE ADULT - ASSESSMENT
23 yo F s/p laparoscopic vertical sleeve gastrectomy 12/4.    Plan:  - ERAS protocol   - Hold tg clears until tomorrow due to nausea  - IVF   - Pain control   - DVT ppx  - OOB and ambulation as tolerated  - F/u AM labs.    [id number] 23 yo F s/p laparoscopic vertical sleeve gastrectomy 12/4.    Plan:  - ERAS protocol   - tg clears   - IVF   - Pain control   - DVT ppx  - OOB and ambulation as tolerated  - F/u AM labs.    [id number]

## 2019-12-05 NOTE — DISCHARGE NOTE PROVIDER - INSTRUCTIONS
Bariatric Full liquid diet starting tomorrow for a period of 2 weeks then bariatric soft/puree for 4 weeks as instructed . Follow up with your dietician in 30 days. Avoid carbonated beverages or sweetened drinks

## 2019-12-05 NOTE — DISCHARGE NOTE PROVIDER - NSDCCPTREATMENT_GEN_ALL_CORE_FT
PRINCIPAL PROCEDURE  Procedure: Laparoscopic sleeve gastrectomy  Findings and Treatment: Bariatric full liquid diet, non-opioid analgesia, dietary supplement, increase water intake, follow up

## 2019-12-05 NOTE — DISCHARGE NOTE PROVIDER - NSDCCPCAREPLAN_GEN_ALL_CORE_FT
PRINCIPAL DISCHARGE DIAGNOSIS  Diagnosis: Morbid (severe) obesity due to excess calories  Assessment and Plan of Treatment: Bariatric Surgery      SECONDARY DISCHARGE DIAGNOSES  Diagnosis: Anemia  Assessment and Plan of Treatment: i shared details here, e.g., 'Noted on CXR 1/1/16.'

## 2019-12-05 NOTE — PROGRESS NOTE ADULT - SUBJECTIVE AND OBJECTIVE BOX
[facility] Progress Note     Subjective/24hour Events:     Patient seen and examined on am rounds. Had n/v overnight with some epigastric pain. - flatus, - BM. Voiding without issues, has been ambulating and OOB. Denies chills/fevers, chest pain, SOB.        Physical Exam:  Gen: NAD.  Lungs: Non labored breathing.   Ab: Soft, nontender, nondistended.  steri strips in place clean / dry / intact  Ext: Moves all 4 spontaneously.     Vital Signs:  Vital Signs Last 24 Hrs  T(C): 36.9 (05 Dec 2019 00:55), Max: 37.2 (04 Dec 2019 21:04)  T(F): 98.4 (05 Dec 2019 00:55), Max: 98.9 (04 Dec 2019 21:04)  HR: 93 (05 Dec 2019 00:55) (87 - 102)  BP: 105/69 (05 Dec 2019 00:55) (105/69 - 150/87)  BP(mean): 89 (04 Dec 2019 13:00) (89 - 112)  RR: 18 (05 Dec 2019 00:55) (16 - 23)  SpO2: 97% (05 Dec 2019 00:55) (96% - 100%)    CAPILLARY BLOOD GLUCOSE      POCT Blood Glucose.: 91 mg/dL (04 Dec 2019 06:07)      I&O's Detail    04 Dec 2019 07:01  -  05 Dec 2019 04:13  --------------------------------------------------------  IN:    IV PiggyBack: 50 mL    lactated ringers.: 750 mL    multivitamin/thiamine/folic acid in sodium chloride 0.9%: 750 mL  Total IN: 1550 mL    OUT:    Estimated Blood Loss: 200 mL    Voided: 800 mL  Total OUT: 1000 mL    Total NET: 550 mL          MEDICATIONS  (STANDING):  ceFAZolin   IVPB 2000 milliGRAM(s) IV Intermittent once  heparin  Injectable 5000 Unit(s) SubCutaneous every 8 hours  hyoscyamine SL 0.125 milliGRAM(s) SubLingual every 6 hours  ketorolac   Injectable 30 milliGRAM(s) IV Push every 6 hours  lactated ringers. 1000 milliLiter(s) (150 mL/Hr) IV Continuous <Continuous>  multivitamin/thiamine/folic acid in sodium chloride 0.9% 1000 milliLiter(s) (250 mL/Hr) IV Continuous <Continuous>  ondansetron Injectable 4 milliGRAM(s) IV Push every 6 hours  pantoprazole  Injectable 40 milliGRAM(s) IV Push every 24 hours    MEDICATIONS  (PRN):          Labs:    12-04    137  |  99  |  7   ----------------------------<  141<H>  3.9   |  21<L>  |  0.59    Ca    9.0      04 Dec 2019 09:45  Phos  3.7     12-04  Mg     1.9     12-04                              11.4   19.62 )-----------( 345      ( 04 Dec 2019 09:45 )             36.7         Imaging: [facility] Progress Note     Subjective/24hour Events:     Patient seen and examined on am rounds. Had n/v overnight with some epigastric pain. - flatus, - BM. Voiding without issues, has been ambulating and OOB. Denies chills/fevers, chest pain, SOB.        Physical Exam:  Gen: NAD.  Lungs: Non labored breathing.   Ab: Soft, mild epigastric pain, nondistended.  steri strips in place clean / dry / intact  Ext: Moves all 4 spontaneously.     Vital Signs:  Vital Signs Last 24 Hrs  T(C): 36.9 (05 Dec 2019 00:55), Max: 37.2 (04 Dec 2019 21:04)  T(F): 98.4 (05 Dec 2019 00:55), Max: 98.9 (04 Dec 2019 21:04)  HR: 93 (05 Dec 2019 00:55) (87 - 102)  BP: 105/69 (05 Dec 2019 00:55) (105/69 - 150/87)  BP(mean): 89 (04 Dec 2019 13:00) (89 - 112)  RR: 18 (05 Dec 2019 00:55) (16 - 23)  SpO2: 97% (05 Dec 2019 00:55) (96% - 100%)    CAPILLARY BLOOD GLUCOSE      POCT Blood Glucose.: 91 mg/dL (04 Dec 2019 06:07)      I&O's Detail    04 Dec 2019 07:01  -  05 Dec 2019 04:13  --------------------------------------------------------  IN:    IV PiggyBack: 50 mL    lactated ringers.: 750 mL    multivitamin/thiamine/folic acid in sodium chloride 0.9%: 750 mL  Total IN: 1550 mL    OUT:    Estimated Blood Loss: 200 mL    Voided: 800 mL  Total OUT: 1000 mL    Total NET: 550 mL          MEDICATIONS  (STANDING):  ceFAZolin   IVPB 2000 milliGRAM(s) IV Intermittent once  heparin  Injectable 5000 Unit(s) SubCutaneous every 8 hours  hyoscyamine SL 0.125 milliGRAM(s) SubLingual every 6 hours  ketorolac   Injectable 30 milliGRAM(s) IV Push every 6 hours  lactated ringers. 1000 milliLiter(s) (150 mL/Hr) IV Continuous <Continuous>  multivitamin/thiamine/folic acid in sodium chloride 0.9% 1000 milliLiter(s) (250 mL/Hr) IV Continuous <Continuous>  ondansetron Injectable 4 milliGRAM(s) IV Push every 6 hours  pantoprazole  Injectable 40 milliGRAM(s) IV Push every 24 hours    MEDICATIONS  (PRN):          Labs:    12-04    137  |  99  |  7   ----------------------------<  141<H>  3.9   |  21<L>  |  0.59    Ca    9.0      04 Dec 2019 09:45  Phos  3.7     12-04  Mg     1.9     12-04                              11.4   19.62 )-----------( 345      ( 04 Dec 2019 09:45 )             36.7         Imaging:

## 2019-12-05 NOTE — DISCHARGE NOTE NURSING/CASE MANAGEMENT/SOCIAL WORK - PATIENT PORTAL LINK FT
You can access the FollowMyHealth Patient Portal offered by [facility] by registering at the following website: [email or web address]. By joining [facility]’s FollowMyHealth portal, you will also be able to view your health information using other applications (apps) compatible with our system.

## 2019-12-05 NOTE — DIETITIAN INITIAL EVALUATION ADULT. - PHYSICAL APPEARANCE
well nourished/other (specify)/obese Height: 63 inches Weight: 230.56 pounds   IBW: 115 pounds IBW%: 200% BMI 40.9kg/m2

## 2019-12-05 NOTE — PROGRESS NOTE ADULT - SUBJECTIVE AND OBJECTIVE BOX
Post Op Day#: 1    Subjective: "epigastric pain with nausea which is much better now."    Objective: No acute events overnight. Effective pain control. Continuous pulse oximetry at bedside functioning,  v/s stable, afebrile, postoperative leukocytosis, this morning lab pending.  Pt OOB and ambulated independently around the unit last night. Will start  bariatric clear liquid diet this morning. Voiding. Pt reports that her mother had DVT/PE in the past and so patient will be d/c home with 2 weeks of LMWH 40mg once a day.                                              Vital Signs Last 24 Hrs  T(C): 36.7 (05 Dec 2019 04:30), Max: 37.2 (04 Dec 2019 21:04)  T(F): 98.1 (05 Dec 2019 04:30), Max: 98.9 (04 Dec 2019 21:04)  HR: 80 (05 Dec 2019 04:30) (80 - 102)  BP: 101/661 (05 Dec 2019 04:30) (101/661 - 150/87)  BP(mean): 89 (04 Dec 2019 13:00) (89 - 112)  RR: 18 (05 Dec 2019 04:30) (16 - 23)  SpO2: 98% (05 Dec 2019 04:30) (96% - 100%)                                               I&O's Summary    04 Dec 2019 07:01  -  05 Dec 2019 06:58  --------------------------------------------------------  IN: 3600 mL / OUT: 1000 mL / NET: 2600 mL                                                                      11.4   19.62 )-----------( 345      ( 04 Dec 2019 09:45 )             36.7                                                 12-04    137  |  99  |  7   ----------------------------<  141<H>  3.9   |  21<L>  |  0.59    Ca    9.0      04 Dec 2019 09:45  Phos  3.7     12-04  Mg     1.9     12-04      heparin  Injectable 5000 Unit(s) SubCutaneous every 8 hours  hyoscyamine SL 0.125 milliGRAM(s) SubLingual every 6 hours  ketorolac   Injectable 30 milliGRAM(s) IV Push every 6 hours  lactated ringers. 1000 milliLiter(s) IV Continuous <Continuous>  multivitamin/thiamine/folic acid in sodium chloride 0.9% 1000 milliLiter(s) IV Continuous <Continuous>  ondansetron Injectable 4 milliGRAM(s) IV Push every 6 hours  pantoprazole  Injectable 40 milliGRAM(s) IV Push every 24 hours      Physical Exam:         Lungs:  clear breath sounds b/l       Heart:  Regular rate & rhythm       Abdomen:  Soft, non-distended.  Scopes sites clean, dry and intact. + bs, - flatus, no rebound or guarding       Skin:  intact, pannus w/o rash       Extremities: + pulses, no edema, no calf tenderness, negative flaca's     VTE Risk Assessment Scores             Caprini VTE Risk Score:                                                               3-4 (low), Perioperative and Postoperative VTE prophylaxis   [facility]  VTE Predicted RISK SCORE:  0.3% (low) No post D/c extended VTE prophylaxis      Assessment and Plan: s/P lap Sleeve Gastrectomy    -- F/U am labs  - Bariatric Clear diet today then protocol derived staged meal progression supervised by RD in outpatient setting  - DVT/GI prophylaxis, Incentive spirometry  - Ambulate Q 2 hours or as indicated  - Lovenox 40mg Sub Q  -  Procedure specific education including diet, vitamins and VTE prevention. Written materials given  - Medication reviewed and reconciled, Bariatric meds obtained from [facility] prior to d/c  -  D/C home once Bariatric 8-Point d/c criteria met  -  Follow up with Dr Garcia in 7-10 days, Dietitian and PMD in 30 days.      Zeenat Gruber, TERESA, ANP  [phone] Post Op Day#: 1    Subjective: "epigastric pain with nausea which is much better now."    Objective: No acute events overnight. Effective pain control, nausea resolved. Continuous pulse oximetry at bedside functioning,  v/s stable, afebrile, immediate postop leukocytosis 19K/ul now nml@ 10K/ul, this morning.  Pt OOB and ambulating  independently around the unit. Will start  bariatric clear liquid diet this morning. Voiding. Pt reports that her mother had DVT/PE in the past and so patient will be d/c home with 2 weeks of LMWH 40mg once a day.                                               Vital Signs Last 24 Hrs  T(C): 36.7 (05 Dec 2019 04:30), Max: 37.2 (04 Dec 2019 21:04)  T(F): 98.1 (05 Dec 2019 04:30), Max: 98.9 (04 Dec 2019 21:04)  HR: 80 (05 Dec 2019 04:30) (80 - 102)  BP: 101/661 (05 Dec 2019 04:30) (101/661 - 150/87)  BP(mean): 89 (04 Dec 2019 13:00) (89 - 112)  RR: 18 (05 Dec 2019 04:30) (16 - 23)  SpO2: 98% (05 Dec 2019 04:30) (96% - 100%)                                               I&O's Summary    04 Dec 2019 07:01  -  05 Dec 2019 06:58  --------------------------------------------------------  IN: 3600 mL / OUT: 1000 mL / NET: 2600 mL                                                                      11.4   19.62 )-----------( 345      ( 04 Dec 2019 09:45 )             36.7                                                 12-04    137  |  99  |  7   ----------------------------<  141<H>  3.9   |  21<L>  |  0.59    Ca    9.0      04 Dec 2019 09:45  Phos  3.7     12-04  Mg     1.9     12-04      heparin  Injectable 5000 Unit(s) SubCutaneous every 8 hours  hyoscyamine SL 0.125 milliGRAM(s) SubLingual every 6 hours  ketorolac   Injectable 30 milliGRAM(s) IV Push every 6 hours  lactated ringers. 1000 milliLiter(s) IV Continuous <Continuous>  multivitamin/thiamine/folic acid in sodium chloride 0.9% 1000 milliLiter(s) IV Continuous <Continuous>  ondansetron Injectable 4 milliGRAM(s) IV Push every 6 hours  pantoprazole  Injectable 40 milliGRAM(s) IV Push every 24 hours      Physical Exam:         Lungs:  clear breath sounds b/l       Heart:  Regular rate & rhythm       Abdomen:  Soft, non-distended.  Scopes sites clean, dry and intact. + bs, - flatus, no rebound or guarding       Skin:  intact, pannus w/o rash       Extremities: + pulses, no edema, no calf tenderness, negative flaca's     VTE Risk Assessment Scores             Caprini VTE Risk Score:                                                               3-4 (low), Perioperative and Postoperative VTE prophylaxis   [facility]  VTE Predicted RISK SCORE:  0.3% (low)       Assessment and Plan: s/P lap Sleeve Gastrectomy    - Bariatric Clear diet today then protocol derived staged meal progression supervised by RD in outpatient setting  - DVT/GI prophylaxis, Incentive spirometry  - Ambulate Q 2 hours or as indicated  - Lovenox 40mg SubQ (reported family hx and unknown hematology etiology)  - Lovenox teaching  -  Procedure specific education including diet, vitamins and VTE prevention. Written materials given  - Medication reviewed and reconciled, Bariatric meds obtained from [facility] prior to d/c  -  D/C home once Bariatric 8-Point d/c criteria met  -  Follow up with Dr Garcia in 7-10 days, Dietitian and PMD in 30 days.      Zeenat Gruber, TERESA, ANP  [phone]

## 2019-12-05 NOTE — DISCHARGE NOTE PROVIDER - NSDCACTIVITY_GEN_ALL_CORE
No heavy lifting/straining/Walking - Outdoors allowed/Stairs allowed/Showering allowed/Driving allowed/Walking - Indoors allowed

## 2019-12-05 NOTE — DIETITIAN INITIAL EVALUATION ADULT. - REASON INDICATOR FOR ASSESSMENT
Pt seen on 2MONTI for bariatric diet education.   Information obtained from patient, electronic medical record.

## 2019-12-05 NOTE — DIETITIAN INITIAL EVALUATION ADULT. - OTHER INFO
Pt reports following a full liquid diet for 2 weeks PTA as instructed by outpatient RD. Pt reports having soups, pudding. Confirms NKFA. Taking iron, Vitamin D3 PTA.      Pt seen for bariatric surgery consult on 2MON. Pt with multiple previous wt loss attempts per chart. Pt tried the following: several structured diet programs, eating healthier, increasing activity level, and was unable to lose and maintain significant wt loss. Per chart, pt met with outpatient RD and weighed 230 pounds (09/2019). Pre-surgical wt (H&P) noted as 231 pounds (12/4). Current wt of 230.56 pounds (12/5/19). Pt now S/P laparoscopic sleeve gastrectomy. Pt denies N+V, sipping on bariatric clear liquids during RD visit. Pt reports she hasn't tried Ensure Max yet, but notes she will start to attempt. Pt made aware RD remains available as needed for questions or concerns. Pt with knowledge of bariatric full liquid diet and receptive to in depth review/reinforcement. Pt reports home stock of protein shakes with plans to purchase more. Pt reports plan to purchase the necessary vitamins/minerals in chewable/liquid/crushable form including a multivitamin with added elemental iron, calcium citrate with vitamin D, and sublingual B12. Pt was advised to take the multivitamin with elemental iron at least 2 hours apart from the calcium citrate with vitamin D for optimal absorption. Pt plans to schedule a follow up appointment with outpatient doctor. Pt able to teach back all points discussed during interview.     Diet Education:   1. Laparoscopic sleeve gastrectomy recommendations reviewed/reinforced (discharge instruction handout references). Bariatric full liquid diet reviewed- sugar free, caffeine free, no carbonated beverages, low fat, no straws, no chewing gum, 6 small meals/day, and sipping beverages slowly: 1 ounce serving every 15-20 minutes as tolerated, no water during meals- drink water 1 hour before or after meals, meeting hydration and protein needs. Discussed vitamin/mineral supplement compliance as discussed with team. Pt encouraged to follow-up with outpatient RD. 2. RD to remain available to reinforce nutrition education as requested by patient/family/caregiver.

## 2019-12-05 NOTE — DISCHARGE NOTE PROVIDER - HOSPITAL COURSE
On December 4, 2019 Ms Wright who has a history of anemia and morbid obesity BMI 40, underwent Laparoscopic Sleeve Gastrectomy. Bariatric ERAS protocol followed to include preoperative and perioperative use of DVT, SSI prophylaxis as well as multi-modal non-opioid analgesics. Patient tolerated the procedure well  extubated in the operating room then transferred to the PACU in stable condition. While in the PACU she complained of nausea and epigastric pain for which she was medicated with relief. Once hemodynamically stable and effective pain control she was able to ambulate with assistance to the bathroom. She voided 500ml of urine. She was later transferred to the bariatric unit and placed on bedside continuous pulse oximetry. Postoperative pain management included scheduled doses of IV multi-modal non-opioid analgesics. The patient remained NPO overnight.        On POD #1 she remained stable and verbalized improvement with nausea symptoms and pain. Bariatric clear liquid diet initiated. Patient is ambulating independently and voiding as expected. Labs within acceptable range. The rest of the hospital course was uneventful, patient met 8-Point Bariatric Surgery D/C criteria and subsequently cleared for discharge home on POD#1. [facility] calculated VTE Risk Stratification score <1%, however pt reported that her mother had a DVT and PE in the past and so it was decided to sent the patient home with 2 weeks of prophylaxis LMWH. VTE risks and medication benefits explained to the patient. She was able to demonstrate administration of SubQ injection. The patient will follow a protocol-derived staged meal progression supervised by the dietitian in the outpatient setting. Patient will follow-up with Dr. Garcia in 7-10 days, medical doctor and dietitian in 30 days. All appropriate prescriptions obtained from [facility] prior to discharge. Written discharge instruction explained and given.

## 2019-12-05 NOTE — PHARMACOTHERAPY INTERVENTION NOTE - COMMENTS
Spoke to patient about absorption issues with medications and the need to crush tablets or open capsules for the next 30 days. Reinforced discussion points from previous counseling. Informed patient of new prescriptions (including SQ lovenox) sent to pharmacy.    No pre-op opioids  Post-op poioids:    PACU: 15 MME (fentanyl IV)    2 Adan: 0 MME  Home: 0 MME     Volodymyr Lozano, PharmD  [phone]

## 2019-12-16 ENCOUNTER — APPOINTMENT (OUTPATIENT)
Dept: SURGERY | Facility: CLINIC | Age: 24
End: 2019-12-16

## 2019-12-17 PROBLEM — E66.01 MORBID (SEVERE) OBESITY DUE TO EXCESS CALORIES: Chronic | Status: ACTIVE | Noted: 2019-11-27

## 2019-12-17 PROBLEM — D64.9 ANEMIA, UNSPECIFIED: Chronic | Status: ACTIVE | Noted: 2019-11-27

## 2020-01-24 ENCOUNTER — APPOINTMENT (OUTPATIENT)
Dept: SURGERY | Facility: CLINIC | Age: 25
End: 2020-01-24

## 2020-07-21 ENCOUNTER — APPOINTMENT (OUTPATIENT)
Dept: CARDIOLOGY | Facility: CLINIC | Age: 25
End: 2020-07-21

## 2020-07-27 ENCOUNTER — APPOINTMENT (OUTPATIENT)
Dept: CARDIOLOGY | Facility: CLINIC | Age: 25
End: 2020-07-27

## 2022-05-08 NOTE — ED PROVIDER NOTE - CROS ED ROS STATEMENT
Bed: 11  Expected date:   Expected time:   Means of arrival:   Comments:  triage   all other ROS negative except as per HPI

## 2022-05-24 NOTE — PRE-ANESTHESIA EVALUATION ADULT - TEMPERATURE IN CELSIUS (DEGREES C)
36.9 Cyclosporine Pregnancy And Lactation Text: This medication is Pregnancy Category C and it isn't know if it is safe during pregnancy. This medication is excreted in breast milk.

## 2022-08-04 ENCOUNTER — NON-APPOINTMENT (OUTPATIENT)
Age: 27
End: 2022-08-04

## 2022-11-12 ENCOUNTER — EMERGENCY (EMERGENCY)
Facility: HOSPITAL | Age: 27
LOS: 1 days | Discharge: HOME | End: 2022-11-12
Attending: EMERGENCY MEDICINE | Admitting: EMERGENCY MEDICINE

## 2022-11-12 VITALS
HEART RATE: 91 BPM | DIASTOLIC BLOOD PRESSURE: 69 MMHG | SYSTOLIC BLOOD PRESSURE: 117 MMHG | RESPIRATION RATE: 16 BRPM | TEMPERATURE: 99 F

## 2022-11-12 VITALS
DIASTOLIC BLOOD PRESSURE: 55 MMHG | OXYGEN SATURATION: 100 % | HEART RATE: 69 BPM | SYSTOLIC BLOOD PRESSURE: 102 MMHG | TEMPERATURE: 99 F | RESPIRATION RATE: 16 BRPM

## 2022-11-12 LAB
ALBUMIN SERPL ELPH-MCNC: 3.7 G/DL — SIGNIFICANT CHANGE UP (ref 3.3–5)
ALP SERPL-CCNC: 70 U/L — SIGNIFICANT CHANGE UP (ref 40–120)
ALT FLD-CCNC: 7 U/L — SIGNIFICANT CHANGE UP (ref 4–33)
ANION GAP SERPL CALC-SCNC: 12 MMOL/L — SIGNIFICANT CHANGE UP (ref 7–14)
APPEARANCE UR: ABNORMAL
AST SERPL-CCNC: 10 U/L — SIGNIFICANT CHANGE UP (ref 4–32)
BACTERIA # UR AUTO: ABNORMAL
BASOPHILS # BLD AUTO: 0.04 K/UL — SIGNIFICANT CHANGE UP (ref 0–0.2)
BASOPHILS NFR BLD AUTO: 0.4 % — SIGNIFICANT CHANGE UP (ref 0–2)
BILIRUB SERPL-MCNC: 0.3 MG/DL — SIGNIFICANT CHANGE UP (ref 0.2–1.2)
BILIRUB UR-MCNC: NEGATIVE — SIGNIFICANT CHANGE UP
BLD GP AB SCN SERPL QL: NEGATIVE — SIGNIFICANT CHANGE UP
BUN SERPL-MCNC: 8 MG/DL — SIGNIFICANT CHANGE UP (ref 7–23)
CALCIUM SERPL-MCNC: 9.6 MG/DL — SIGNIFICANT CHANGE UP (ref 8.4–10.5)
CHLORIDE SERPL-SCNC: 103 MMOL/L — SIGNIFICANT CHANGE UP (ref 98–107)
CO2 SERPL-SCNC: 22 MMOL/L — SIGNIFICANT CHANGE UP (ref 22–31)
COLOR SPEC: YELLOW — SIGNIFICANT CHANGE UP
CREAT SERPL-MCNC: 0.46 MG/DL — LOW (ref 0.5–1.3)
DIFF PNL FLD: ABNORMAL
EGFR: 136 ML/MIN/1.73M2 — SIGNIFICANT CHANGE UP
EOSINOPHIL # BLD AUTO: 0.07 K/UL — SIGNIFICANT CHANGE UP (ref 0–0.5)
EOSINOPHIL NFR BLD AUTO: 0.7 % — SIGNIFICANT CHANGE UP (ref 0–6)
EPI CELLS # UR: 8 /HPF — HIGH (ref 0–5)
GLUCOSE SERPL-MCNC: 92 MG/DL — SIGNIFICANT CHANGE UP (ref 70–99)
GLUCOSE UR QL: NEGATIVE — SIGNIFICANT CHANGE UP
HCT VFR BLD CALC: 31.8 % — LOW (ref 34.5–45)
HGB BLD-MCNC: 10 G/DL — LOW (ref 11.5–15.5)
HYALINE CASTS # UR AUTO: 1 /LPF — SIGNIFICANT CHANGE UP (ref 0–7)
IANC: 7.57 K/UL — HIGH (ref 1.8–7.4)
IMM GRANULOCYTES NFR BLD AUTO: 0.4 % — SIGNIFICANT CHANGE UP (ref 0–0.9)
KETONES UR-MCNC: ABNORMAL
LEUKOCYTE ESTERASE UR-ACNC: ABNORMAL
LYMPHOCYTES # BLD AUTO: 1.38 K/UL — SIGNIFICANT CHANGE UP (ref 1–3.3)
LYMPHOCYTES # BLD AUTO: 13.7 % — SIGNIFICANT CHANGE UP (ref 13–44)
MCHC RBC-ENTMCNC: 24.9 PG — LOW (ref 27–34)
MCHC RBC-ENTMCNC: 31.4 GM/DL — LOW (ref 32–36)
MCV RBC AUTO: 79.1 FL — LOW (ref 80–100)
MONOCYTES # BLD AUTO: 0.97 K/UL — HIGH (ref 0–0.9)
MONOCYTES NFR BLD AUTO: 9.6 % — SIGNIFICANT CHANGE UP (ref 2–14)
NEUTROPHILS # BLD AUTO: 7.57 K/UL — HIGH (ref 1.8–7.4)
NEUTROPHILS NFR BLD AUTO: 75.2 % — SIGNIFICANT CHANGE UP (ref 43–77)
NITRITE UR-MCNC: NEGATIVE — SIGNIFICANT CHANGE UP
NRBC # BLD: 0 /100 WBCS — SIGNIFICANT CHANGE UP (ref 0–0)
NRBC # FLD: 0 K/UL — SIGNIFICANT CHANGE UP (ref 0–0)
PH UR: 6 — SIGNIFICANT CHANGE UP (ref 5–8)
PLATELET # BLD AUTO: 415 K/UL — HIGH (ref 150–400)
POTASSIUM SERPL-MCNC: 3.8 MMOL/L — SIGNIFICANT CHANGE UP (ref 3.5–5.3)
POTASSIUM SERPL-SCNC: 3.8 MMOL/L — SIGNIFICANT CHANGE UP (ref 3.5–5.3)
PROT SERPL-MCNC: 7.6 G/DL — SIGNIFICANT CHANGE UP (ref 6–8.3)
PROT UR-MCNC: ABNORMAL
RBC # BLD: 4.02 M/UL — SIGNIFICANT CHANGE UP (ref 3.8–5.2)
RBC # FLD: 17.1 % — HIGH (ref 10.3–14.5)
RBC CASTS # UR COMP ASSIST: 6 /HPF — HIGH (ref 0–4)
RH IG SCN BLD-IMP: POSITIVE — SIGNIFICANT CHANGE UP
SODIUM SERPL-SCNC: 137 MMOL/L — SIGNIFICANT CHANGE UP (ref 135–145)
SP GR SPEC: 1.03 — SIGNIFICANT CHANGE UP (ref 1.01–1.05)
UROBILINOGEN FLD QL: ABNORMAL
WBC # BLD: 10.07 K/UL — SIGNIFICANT CHANGE UP (ref 3.8–10.5)
WBC # FLD AUTO: 10.07 K/UL — SIGNIFICANT CHANGE UP (ref 3.8–10.5)
WBC UR QL: 3 /HPF — SIGNIFICANT CHANGE UP (ref 0–5)

## 2022-11-12 PROCEDURE — 99285 EMERGENCY DEPT VISIT HI MDM: CPT

## 2022-11-12 PROCEDURE — 76817 TRANSVAGINAL US OBSTETRIC: CPT | Mod: 26

## 2022-11-12 RX ORDER — CEPHALEXIN 500 MG
1 CAPSULE ORAL
Qty: 21 | Refills: 0
Start: 2022-11-12 | End: 2022-11-18

## 2022-11-12 RX ORDER — CEPHALEXIN 500 MG
500 CAPSULE ORAL ONCE
Refills: 0 | Status: COMPLETED | OUTPATIENT
Start: 2022-11-12 | End: 2022-11-12

## 2022-11-12 RX ORDER — ACETAMINOPHEN 500 MG
975 TABLET ORAL ONCE
Refills: 0 | Status: COMPLETED | OUTPATIENT
Start: 2022-11-12 | End: 2022-11-12

## 2022-11-12 RX ADMIN — Medication 975 MILLIGRAM(S): at 18:41

## 2022-11-12 RX ADMIN — Medication 500 MILLIGRAM(S): at 22:10

## 2022-11-12 NOTE — ED PROVIDER NOTE - OBJECTIVE STATEMENT
27-year-old G1, P0 with a history of uterine fibroids presents at 12 weeks gestation with ultrasound confirmed IUP.  She complained of lower abdominal pain as sharp cramping and initially intermittent x1 week.  Since 11/8 pain has become constant and more severe.  Patient states she began having some light uterine bleeding since yesterday.  Liners only, not soaking through pads.  Endorses nausea however no vomiting diarrhea. no urinary sx. Tolerating p.o.  Has not taken anything for pain today.

## 2022-11-12 NOTE — ED PROVIDER NOTE - ATTENDING CONTRIBUTION TO CARE
abdominal cramping and vag bleeding as described in patient with documented IUP ? Threatened AB  Blood type A+  will perform sono to eval fetus but mom in no imminent danger   Will reassess

## 2022-11-12 NOTE — ED ADULT NURSE NOTE - OBJECTIVE STATEMENT
12 weeks preg, a&ox4, c/o abd cramping and vag bleeding progressively worsening for a week. well appearing, resp even and unlabored. steady on her feet. labs sent. waiting for us result

## 2022-11-12 NOTE — ED PROVIDER NOTE - PROGRESS NOTE DETAILS
Girma PGY2: took pt in sign out at 7pm. Pending TVUS and UA. TVUS 1 UOP without any other concerning actionable findings. Results discussed - UA mildly +ve and will start keflex given pregnant.

## 2022-11-12 NOTE — ED PROVIDER NOTE - NSFOLLOWUPINSTRUCTIONS_ED_ALL_ED_FT
Bleeding in Pregnancy    - Ultrasound showed pregnancy, lab work did not show any actionable findings in regards to the bleeding  - Follow up closely with your GYN provider on discharge  - If having worsening pain or bleeding - seek immediate medical evaluation.    Urinary Tract Infection    A urinary tract infection (UTI) is an infection of any part of the urinary tract, which includes the kidneys, ureters, bladder, and urethra. Risk factors include ignoring your need to urinate, wiping back to front if female, being an uncircumcised male, and having diabetes or a weak immune system. Symptoms include frequent urination, pain or burning with urination, foul smelling urine, cloudy urine, pain in the lower abdomen, blood in the urine, and fever. If you were prescribed an antibiotic medicine, take it as told by your health care provider. Do not stop taking the antibiotic even if you start to feel better.    - Hydrate well  - Continue keflex 500mg three times a day for a week.  - Follow up with your primary care provider.     SEEK IMMEDIATE MEDICAL CARE IF YOU HAVE ANY OF THE FOLLOWING SYMPTOMS: severe back or abdominal pain, fever, inability to keep fluids or medicine down, dizziness/lightheadedness, or a change in mental status.

## 2022-11-12 NOTE — ED PROVIDER NOTE - PHYSICAL EXAMINATION
General: non-toxic, NAD  HEENT: NCAT, PERRL, no conjunctival pallor.   Cardiac: RRR, no murmurs, 2+ peripheral pulses  Resp: CTAB  Abdomen: soft, non-distended, bowel sounds present, +ttp suprapubic, no rebound or guarding. no organomegaly  Extremities: no peripheral edema, calf tenderness, or leg size discrepancies  Skin: no rashes  Neuro: AAOx4, 5+motor, sensation grossly intact  Psych: mood and affect appropriate

## 2022-11-12 NOTE — ED PROVIDER NOTE - CLINICAL SUMMARY MEDICAL DECISION MAKING FREE TEXT BOX
pregnant female with light uterine bleeding otherwise stable with only mild ttp suprapubically. ddx includes, subchorionic hematoma v threatened  v miscarriage v less likely heterotopic.

## 2022-11-12 NOTE — ED PROVIDER NOTE - PATIENT PORTAL LINK FT
You can access the FollowMyHealth Patient Portal offered by [facility] by registering at the following website: [email or web address]. By joining [employer]’s FollowMyHealth portal, you will also be able to view your health information using other applications (apps) compatible with our system.

## 2022-11-12 NOTE — ED PROVIDER NOTE - NS ED ROS FT
Constitutional: no fevers, chills  HEENT: no HA, vision changes, rhinorrhea, sore throat  Cardiac: no chest pain, palpitations  Respiratory: no SOB, cough or hemoptysis  GI: no n/v/d/c, +abd pain, no bloody or dark stools  : no dysuria, frequency, or hematuria  MSK: no joint pain, neck pain or back pain  Skin: no rashes, jaundice, pruritis  Neuro: no numbness/tingling, weakness, unsteady gait  ROS otherwise neg except per hpi

## 2022-11-12 NOTE — ED ADULT TRIAGE NOTE - CHIEF COMPLAINT QUOTE
Pt is 12 wks pregnant, NIYAH 22. . c/o pelvic pain and vaginal spotting since last night. Hx BV on Metronidazole. Unable to obtain O2 sat due to long finger nails.

## 2022-11-14 ENCOUNTER — EMERGENCY (EMERGENCY)
Facility: HOSPITAL | Age: 27
LOS: 1 days | Discharge: HOME | End: 2022-11-14
Attending: STUDENT IN AN ORGANIZED HEALTH CARE EDUCATION/TRAINING PROGRAM | Admitting: STUDENT IN AN ORGANIZED HEALTH CARE EDUCATION/TRAINING PROGRAM

## 2022-11-14 VITALS
DIASTOLIC BLOOD PRESSURE: 59 MMHG | SYSTOLIC BLOOD PRESSURE: 117 MMHG | RESPIRATION RATE: 18 BRPM | HEART RATE: 95 BPM | TEMPERATURE: 98 F | OXYGEN SATURATION: 100 %

## 2022-11-14 PROCEDURE — 99285 EMERGENCY DEPT VISIT HI MDM: CPT

## 2022-11-14 NOTE — ED ADULT NURSE NOTE - OBJECTIVE STATEMENT
Patient A/Ox4, ambulatory, c/o vaginal bleeding. Patient states she is 12 weeks 6 days pregnant and has been experiencing vaginal bleeding and intermittent abdominal cramping x 2 days. Denies N/V, fever, chills, chest pain and SOB. 20G IV placed to left AC. US pending,

## 2022-11-15 VITALS
SYSTOLIC BLOOD PRESSURE: 118 MMHG | RESPIRATION RATE: 16 BRPM | HEART RATE: 78 BPM | DIASTOLIC BLOOD PRESSURE: 60 MMHG | TEMPERATURE: 98 F | OXYGEN SATURATION: 100 %

## 2022-11-15 LAB
-  AMIKACIN: SIGNIFICANT CHANGE UP
-  AMOXICILLIN/CLAVULANIC ACID: SIGNIFICANT CHANGE UP
-  AMPICILLIN/SULBACTAM: SIGNIFICANT CHANGE UP
-  AMPICILLIN: SIGNIFICANT CHANGE UP
-  AZTREONAM: SIGNIFICANT CHANGE UP
-  CEFAZOLIN: SIGNIFICANT CHANGE UP
-  CEFEPIME: SIGNIFICANT CHANGE UP
-  CEFOXITIN: SIGNIFICANT CHANGE UP
-  CEFTRIAXONE: SIGNIFICANT CHANGE UP
-  CIPROFLOXACIN: SIGNIFICANT CHANGE UP
-  ERTAPENEM: SIGNIFICANT CHANGE UP
-  GENTAMICIN: SIGNIFICANT CHANGE UP
-  IMIPENEM: SIGNIFICANT CHANGE UP
-  LEVOFLOXACIN: SIGNIFICANT CHANGE UP
-  MEROPENEM: SIGNIFICANT CHANGE UP
-  NITROFURANTOIN: SIGNIFICANT CHANGE UP
-  PIPERACILLIN/TAZOBACTAM: SIGNIFICANT CHANGE UP
-  TOBRAMYCIN: SIGNIFICANT CHANGE UP
-  TRIMETHOPRIM/SULFAMETHOXAZOLE: SIGNIFICANT CHANGE UP
ALBUMIN SERPL ELPH-MCNC: 3.5 G/DL — SIGNIFICANT CHANGE UP (ref 3.3–5)
ALP SERPL-CCNC: 65 U/L — SIGNIFICANT CHANGE UP (ref 40–120)
ALT FLD-CCNC: <5 U/L — SIGNIFICANT CHANGE UP (ref 4–33)
ANION GAP SERPL CALC-SCNC: 12 MMOL/L — SIGNIFICANT CHANGE UP (ref 7–14)
APPEARANCE UR: CLEAR — SIGNIFICANT CHANGE UP
AST SERPL-CCNC: 9 U/L — SIGNIFICANT CHANGE UP (ref 4–32)
BACTERIA # UR AUTO: NEGATIVE — SIGNIFICANT CHANGE UP
BASOPHILS # BLD AUTO: 0.04 K/UL — SIGNIFICANT CHANGE UP (ref 0–0.2)
BASOPHILS NFR BLD AUTO: 0.4 % — SIGNIFICANT CHANGE UP (ref 0–2)
BILIRUB SERPL-MCNC: 0.4 MG/DL — SIGNIFICANT CHANGE UP (ref 0.2–1.2)
BILIRUB UR-MCNC: NEGATIVE — SIGNIFICANT CHANGE UP
BUN SERPL-MCNC: 7 MG/DL — SIGNIFICANT CHANGE UP (ref 7–23)
CALCIUM SERPL-MCNC: 9.4 MG/DL — SIGNIFICANT CHANGE UP (ref 8.4–10.5)
CHLORIDE SERPL-SCNC: 103 MMOL/L — SIGNIFICANT CHANGE UP (ref 98–107)
CO2 SERPL-SCNC: 22 MMOL/L — SIGNIFICANT CHANGE UP (ref 22–31)
COLOR SPEC: YELLOW — SIGNIFICANT CHANGE UP
CREAT SERPL-MCNC: 0.49 MG/DL — LOW (ref 0.5–1.3)
CULTURE RESULTS: SIGNIFICANT CHANGE UP
DIFF PNL FLD: ABNORMAL
EGFR: 134 ML/MIN/1.73M2 — SIGNIFICANT CHANGE UP
EOSINOPHIL # BLD AUTO: 0.08 K/UL — SIGNIFICANT CHANGE UP (ref 0–0.5)
EOSINOPHIL NFR BLD AUTO: 0.7 % — SIGNIFICANT CHANGE UP (ref 0–6)
EPI CELLS # UR: 4 /HPF — SIGNIFICANT CHANGE UP (ref 0–5)
GLUCOSE SERPL-MCNC: 80 MG/DL — SIGNIFICANT CHANGE UP (ref 70–99)
GLUCOSE UR QL: NEGATIVE — SIGNIFICANT CHANGE UP
HCG SERPL-ACNC: SIGNIFICANT CHANGE UP MIU/ML
HCT VFR BLD CALC: 30.1 % — LOW (ref 34.5–45)
HGB BLD-MCNC: 9.5 G/DL — LOW (ref 11.5–15.5)
HYALINE CASTS # UR AUTO: 1 /LPF — SIGNIFICANT CHANGE UP (ref 0–7)
IANC: 8.23 K/UL — HIGH (ref 1.8–7.4)
IMM GRANULOCYTES NFR BLD AUTO: 0.4 % — SIGNIFICANT CHANGE UP (ref 0–0.9)
KETONES UR-MCNC: NEGATIVE — SIGNIFICANT CHANGE UP
LEUKOCYTE ESTERASE UR-ACNC: NEGATIVE — SIGNIFICANT CHANGE UP
LYMPHOCYTES # BLD AUTO: 1.76 K/UL — SIGNIFICANT CHANGE UP (ref 1–3.3)
LYMPHOCYTES # BLD AUTO: 15.6 % — SIGNIFICANT CHANGE UP (ref 13–44)
MCHC RBC-ENTMCNC: 24.8 PG — LOW (ref 27–34)
MCHC RBC-ENTMCNC: 31.6 GM/DL — LOW (ref 32–36)
MCV RBC AUTO: 78.6 FL — LOW (ref 80–100)
METHOD TYPE: SIGNIFICANT CHANGE UP
MONOCYTES # BLD AUTO: 1.12 K/UL — HIGH (ref 0–0.9)
MONOCYTES NFR BLD AUTO: 9.9 % — SIGNIFICANT CHANGE UP (ref 2–14)
NEUTROPHILS # BLD AUTO: 8.23 K/UL — HIGH (ref 1.8–7.4)
NEUTROPHILS NFR BLD AUTO: 73 % — SIGNIFICANT CHANGE UP (ref 43–77)
NITRITE UR-MCNC: NEGATIVE — SIGNIFICANT CHANGE UP
NRBC # BLD: 0 /100 WBCS — SIGNIFICANT CHANGE UP (ref 0–0)
NRBC # FLD: 0 K/UL — SIGNIFICANT CHANGE UP (ref 0–0)
ORGANISM # SPEC MICROSCOPIC CNT: SIGNIFICANT CHANGE UP
ORGANISM # SPEC MICROSCOPIC CNT: SIGNIFICANT CHANGE UP
PH UR: 5.5 — SIGNIFICANT CHANGE UP (ref 5–8)
PLATELET # BLD AUTO: 389 K/UL — SIGNIFICANT CHANGE UP (ref 150–400)
POTASSIUM SERPL-MCNC: 4 MMOL/L — SIGNIFICANT CHANGE UP (ref 3.5–5.3)
POTASSIUM SERPL-SCNC: 4 MMOL/L — SIGNIFICANT CHANGE UP (ref 3.5–5.3)
PROT SERPL-MCNC: 7.2 G/DL — SIGNIFICANT CHANGE UP (ref 6–8.3)
PROT UR-MCNC: ABNORMAL
RBC # BLD: 3.83 M/UL — SIGNIFICANT CHANGE UP (ref 3.8–5.2)
RBC # FLD: 17.2 % — HIGH (ref 10.3–14.5)
RBC CASTS # UR COMP ASSIST: 2 /HPF — SIGNIFICANT CHANGE UP (ref 0–4)
SODIUM SERPL-SCNC: 137 MMOL/L — SIGNIFICANT CHANGE UP (ref 135–145)
SP GR SPEC: 1.02 — SIGNIFICANT CHANGE UP (ref 1.01–1.05)
SPECIMEN SOURCE: SIGNIFICANT CHANGE UP
UROBILINOGEN FLD QL: ABNORMAL
WBC # BLD: 11.27 K/UL — HIGH (ref 3.8–10.5)
WBC # FLD AUTO: 11.27 K/UL — HIGH (ref 3.8–10.5)
WBC UR QL: 4 /HPF — SIGNIFICANT CHANGE UP (ref 0–5)

## 2022-11-15 PROCEDURE — 76830 TRANSVAGINAL US NON-OB: CPT | Mod: 26

## 2022-11-15 NOTE — ED PROVIDER NOTE - ATTENDING APP SHARED VISIT CONTRIBUTION OF CARE
I have personally performed a face to face medical and diagnostic evaluation of the patient. I have discussed with and reviewed the TAMARA's note and agree with the History, ROS, Physical Exam and MDM unless otherwise indicated. A brief summary of my personal evaluation and impression can be found below.    Bogdan PARIKH: Please see the HPI, ROS, PE and MDM as authored by me.

## 2022-11-15 NOTE — ED PROVIDER NOTE - CLINICAL SUMMARY MEDICAL DECISION MAKING FREE TEXT BOX
Bogdan PARIKH: 27-year-old female G1, P0 approximately 12 weeks pregnant with confirmed IUP followed by OB.  Presents to ED after being being seen in OB office outpatient earlier tonight with concern for possible miscarriage.  Patient reports has been having intermittent vaginal bleeding going on for the last few weeks that has been persisting now associated with crampy lower abdominal discomfort over the last day or so that is sharp in nature.  No nausea no vomiting no chest pain no shortness of breath no lightheadedness or dizziness no urinary symptoms Exam vital signs stable nontoxic-appearing with mild diffuse lower abdominal tenderness on exam.  DDx concern for possible inevitable versus threatened miscarriage.  Will check labs urine transvaginal ultrasound type and screen meds as needed OB consult as indicated reassess thereafter.

## 2022-11-15 NOTE — ED PROVIDER NOTE - NSFOLLOWUPINSTRUCTIONS_ED_ALL_ED_FT
Threatened Miscarriage    A threatened miscarriage is the term for vaginal bleeding during your first 20 weeks of pregnancy but the pregnancy has not ended but is possible. If you have vaginal bleeding during this time, your health care provider will do tests to check if you are still pregnant. If the tests show you are still pregnant and the developing baby (fetus) inside your womb (uterus) is still growing, your condition is considered a threatened miscarriage. A threatened miscarriage does not mean your pregnancy will end, but it does increase the risk of losing your pregnancy. It is important to have close follow up with your obstetrician.    Your ultrasound showed open cervical os with passage of some material throughout it concerning for miscarriage.  Please follow up closely with your GYN in next 48 hours  Avoid any strenuous activities, no heavy lifting.     Monitor for worsening bleeding and/or pain. If you are soaking through >2pads/hr for 2 hours or having intractable pain - seek immediate medical evaluation    SEEK IMMEDIATE MEDICAL CARE IF YOU HAVE ANY OF THE FOLLOWING SYMPTOMS: heavy vaginal bleeding, severe low back or abdominal cramps, fever/chills, or lightheadedness/dizziness.

## 2022-11-15 NOTE — PROVIDER CONTACT NOTE (OTHER) - ACTION/TREATMENT ORDERED:
Called [facility]; requested taxi; trip conf # [id number].  Await assignment of transport provider; pt informed.

## 2022-11-15 NOTE — ED PROVIDER NOTE - PROGRESS NOTE DETAILS
Girma PGY2: tvus shows threatened  in progress. Pt made aware of findings and need to f/u with her gyn and return precautions.

## 2022-11-15 NOTE — ED PROVIDER NOTE - PHYSICAL EXAMINATION
Bogdan PARIKH:  VITALS: Initial triage and subsequent vitals have been reviewed by me.  GEN APPEARANCE: WDWN, alert and cooperative, non-toxic appearing and in NAD  HEAD: Atraumatic, normocephalic   EYES: PERRLa, EOMI, vision grossly intact.   EARS: Gross hearing intact.   NOSE: No nasal discharge, no external evidence of epistaxis.   NECK: Supple  CV: RRR, S1S2, no c/r/m/g. No cyanosis. Extremities warm, well perfused. Cap refill <2 seconds. No bruits.   LUNGS: CTAB. No wheezing. No rales. No rhonchi. No diminished breath sounds.   ABDOMEN: Mild diffuse lower abdominal Tenderness  MSK/EXT: Spine appears normal, no spine point tenderness. No CVA ttp. Normal muscular development. Pelvis stable. No obvious joint or bony deformity, no peripheral edema.   NEURO: Alert, follows commands. Weight bearing normal. Speech normal. Sensation and motor normal x4 extremities.   SKIN: Normal color for race, warm, dry and intact. No evidence of rash.  PSYCH: Normal mood and affect.   Exam (Female): External genitalia normal, No blood in vaginal vault. Os was incompletely visualized however with likely mucus-like brown discharge coming from os.  No blood in vault.  Mild right adnexal tenderness.  Exam per MACI Harvey with myself as chaperone.

## 2022-11-15 NOTE — ED PROVIDER NOTE - ADDITIONAL NOTES AND INSTRUCTIONS:
To Whom it May Concern - Patient seen and evaluated in Emergency Room. Please excuse the above individual for medical care and recovery until date above. Thank you for you care. - Adia Rayo MD.

## 2022-11-15 NOTE — ED PROVIDER NOTE - PATIENT PORTAL LINK FT
You can access the FollowMyHealth Patient Portal offered by [facility] by registering at the following website: [email or web address]. By joining [employer]’s FollowMyHealth portal, you will also be able to view your health information using other applications (apps) compatible with our system.

## 2022-11-15 NOTE — ED PROVIDER NOTE - OBJECTIVE STATEMENT
Bogdan PARIKH: 27-year-old female G1, P0 approximately 12 weeks pregnant with confirmed IUP followed by OB.  Presents to ED after being being seen in OB office outpatient earlier tonight with concern for possible miscarriage.  Patient reports has been having intermittent vaginal bleeding going on for the last few weeks that has been persisting now associated with crampy lower abdominal discomfort over the last day or so that is sharp in nature.  No nausea no vomiting no chest pain no shortness of breath no lightheadedness or dizziness no urinary symptoms.  Patient reports was seen in ED 2 days ago for similar complaints was diagnosed with a UTI and prescribed Keflex.

## 2022-11-15 NOTE — ED PROVIDER NOTE - NS ED ATTENDING STATEMENT MOD
This was a shared visit with the TAMARA. I reviewed and verified the documentation and independently performed the documented: Attending with

## 2022-11-16 LAB
CULTURE RESULTS: NO GROWTH — SIGNIFICANT CHANGE UP
SPECIMEN SOURCE: SIGNIFICANT CHANGE UP

## 2023-03-14 ENCOUNTER — APPOINTMENT (OUTPATIENT)
Dept: ULTRASOUND IMAGING | Facility: CLINIC | Age: 28
End: 2023-03-14

## 2023-04-24 ENCOUNTER — APPOINTMENT (OUTPATIENT)
Dept: SURGERY | Facility: CLINIC | Age: 28
End: 2023-04-24
Payer: MEDICAID

## 2023-04-24 VITALS
RESPIRATION RATE: 17 BRPM | HEART RATE: 82 BPM | OXYGEN SATURATION: 100 % | SYSTOLIC BLOOD PRESSURE: 116 MMHG | WEIGHT: 190 LBS | BODY MASS INDEX: 33.66 KG/M2 | DIASTOLIC BLOOD PRESSURE: 76 MMHG | HEIGHT: 63 IN | TEMPERATURE: 96.5 F

## 2023-04-24 PROCEDURE — 99214 OFFICE O/P EST MOD 30 MIN: CPT

## 2023-04-24 RX ORDER — PHENTERMINE HYDROCHLORIDE 37.5 MG/1
37.5 TABLET ORAL DAILY
Qty: 30 | Refills: 2 | Status: ACTIVE | COMMUNITY
Start: 2023-04-24 | End: 1900-01-01

## 2023-04-24 NOTE — HISTORY OF PRESENT ILLNESS
[de-identified] : Sana is a 28 y/o female here for a reconsultation visit for weight management. S/p gastrectomy sleeve in 12/4/19.

## 2023-06-19 ENCOUNTER — APPOINTMENT (OUTPATIENT)
Dept: SURGERY | Facility: CLINIC | Age: 28
End: 2023-06-19

## 2023-06-20 ENCOUNTER — APPOINTMENT (OUTPATIENT)
Dept: SURGERY | Facility: CLINIC | Age: 28
End: 2023-06-20
Payer: MEDICAID

## 2023-06-20 VITALS
BODY MASS INDEX: 33.49 KG/M2 | OXYGEN SATURATION: 99 % | SYSTOLIC BLOOD PRESSURE: 120 MMHG | WEIGHT: 189 LBS | HEART RATE: 85 BPM | HEIGHT: 63 IN | TEMPERATURE: 97.1 F | DIASTOLIC BLOOD PRESSURE: 77 MMHG | RESPIRATION RATE: 17 BRPM

## 2023-06-20 PROCEDURE — 99024 POSTOP FOLLOW-UP VISIT: CPT

## 2023-06-22 ENCOUNTER — APPOINTMENT (OUTPATIENT)
Dept: SURGERY | Facility: CLINIC | Age: 28
End: 2023-06-22
Payer: MEDICAID

## 2023-06-23 ENCOUNTER — APPOINTMENT (OUTPATIENT)
Dept: SURGERY | Facility: CLINIC | Age: 28
End: 2023-06-23
Payer: MEDICAID

## 2023-06-23 VITALS
RESPIRATION RATE: 17 BRPM | OXYGEN SATURATION: 99 % | WEIGHT: 190.38 LBS | SYSTOLIC BLOOD PRESSURE: 114 MMHG | DIASTOLIC BLOOD PRESSURE: 78 MMHG | TEMPERATURE: 97.1 F | HEART RATE: 85 BPM | HEIGHT: 63 IN | BODY MASS INDEX: 33.73 KG/M2

## 2023-06-23 PROCEDURE — 99213 OFFICE O/P EST LOW 20 MIN: CPT

## 2023-06-23 RX ORDER — TOPIRAMATE 25 MG/1
25 TABLET, FILM COATED ORAL
Qty: 60 | Refills: 1 | Status: ACTIVE | COMMUNITY
Start: 2023-06-23 | End: 1900-01-01

## 2023-06-23 NOTE — HISTORY OF PRESENT ILLNESS
[de-identified] : Sana is a 27-year-old female that presents for medical follow up  after initial bariatric surgery reconsultation on April 24, 2023.  At the last visit patient was placed on phentermine 37.5 mg tablets.  Presents for follow-up today. [de-identified] : There is no weight change since seen 2 months ago while starting phentermine.  After discussion and based on patient's insurance plan I have switched her from phentermine tablet to phentermine capsule.  37.5 mg capsules ordered.  Topamax was added today as an adjunct to the phentermine.  Patient will start with 25 mg nightly x1 week and then advance to 25 mg 2 tablets nightly totaling 50 mg Topamax daily.

## 2023-06-23 NOTE — PLAN
[FreeTextEntry1] : [] Switch phentermine from tablets to capsules.  37.5 mg once daily.  \par [] Add topiramate 25 mg once daily nightly and will increase to 25 mg 2 tablets daily nightly as tolerated.\par [] Increase physical activity to include 3 days minimum at gym.\par [] See in 2 months for follow-up.\par

## 2023-06-23 NOTE — ASSESSMENT
[FreeTextEntry1] : In summary patient had no response to phentermine tablets however I decided to switch her to capsules today as this formulation may increase efficacy.  I also believe patient would benefit by adding topiramate to the phentermine so synergistically they can work together.  Patient agrees with plan and will prescribe accordingly.

## 2023-08-28 ENCOUNTER — APPOINTMENT (OUTPATIENT)
Dept: SURGERY | Facility: CLINIC | Age: 28
End: 2023-08-28
Payer: MEDICAID

## 2023-08-28 VITALS
HEART RATE: 74 BPM | RESPIRATION RATE: 17 BRPM | BODY MASS INDEX: 33.37 KG/M2 | DIASTOLIC BLOOD PRESSURE: 74 MMHG | OXYGEN SATURATION: 98 % | SYSTOLIC BLOOD PRESSURE: 114 MMHG | TEMPERATURE: 97 F | HEIGHT: 63 IN | WEIGHT: 188.31 LBS

## 2023-08-28 PROCEDURE — 99212 OFFICE O/P EST SF 10 MIN: CPT

## 2023-08-30 NOTE — HISTORY OF PRESENT ILLNESS
[de-identified] : Sana is a 27-year-old female that presents for medical follow up after initial bariatric surgery reconsultation on April 24, 2023. At the last visit patient was placed on phentermine 37.5 mg tablets. She was subsequently also placed on topiramate due to lack of efficacy from the phentermine.  She presents for follow-up today after last being seen June 25.

## 2023-09-05 ENCOUNTER — APPOINTMENT (OUTPATIENT)
Dept: SURGERY | Facility: CLINIC | Age: 28
End: 2023-09-05

## 2023-10-23 ENCOUNTER — APPOINTMENT (OUTPATIENT)
Dept: SURGERY | Facility: CLINIC | Age: 28
End: 2023-10-23
Payer: MEDICAID

## 2023-10-23 VITALS
DIASTOLIC BLOOD PRESSURE: 80 MMHG | BODY MASS INDEX: 32.48 KG/M2 | HEART RATE: 76 BPM | RESPIRATION RATE: 16 BRPM | HEIGHT: 63 IN | OXYGEN SATURATION: 99 % | TEMPERATURE: 97.3 F | SYSTOLIC BLOOD PRESSURE: 132 MMHG | WEIGHT: 183.31 LBS

## 2023-10-23 DIAGNOSIS — E66.01 MORBID (SEVERE) OBESITY DUE TO EXCESS CALORIES: ICD-10-CM

## 2023-10-23 PROCEDURE — 99214 OFFICE O/P EST MOD 30 MIN: CPT

## 2023-12-04 RX ORDER — PHENTERMINE HYDROCHLORIDE 37.5 MG/1
37.5 CAPSULE ORAL
Qty: 30 | Refills: 1 | Status: ACTIVE | COMMUNITY
Start: 2023-06-23 | End: 1900-01-01

## 2023-12-22 ENCOUNTER — APPOINTMENT (OUTPATIENT)
Dept: SURGERY | Facility: CLINIC | Age: 28
End: 2023-12-22

## 2024-04-19 NOTE — ED ADULT NURSE NOTE - NSIMPLEMENTINTERV_GEN_ALL_ED
Billing Type: Third-Party Bill Performing Laboratory: -[id number] Bill For Surgical Tray: no Expected Date Of Service: 04/19/2024 Lab Facility: 0 Implemented All Universal Safety Interventions:  [place] to call system. Call bell, personal items and telephone within reach. Instruct patient to call for assistance. Room bathroom lighting operational. Non-slip footwear when patient is off stretcher. Physically safe environment: no spills, clutter or unnecessary equipment. Stretcher in lowest position, wheels locked, appropriate side rails in place.

## 2024-05-13 NOTE — ED PROVIDER NOTE - ATTENDING CONTRIBUTION TO CARE
Follow up 6 months    Labs in 6 months   
I have personally performed a face to face medical and diagnostic evaluation of the patient. I have discussed with and reviewed the Resident's note and agree with the History, ROS, Physical Exam and MDM unless otherwise indicated. A brief summary of my personal evaluation and impression can be found below.    Bogdan PARIKH: Please see the HPI, ROS, PE and MDM as authored by me.

## 2024-08-22 NOTE — ED PROVIDER NOTE - ADDITIONAL NOTES AND INSTRUCTIONS:
Metabolic acidosis in setting of renal failure and sepsis. CRRT as noted above. Lactate was normal. Monitor Labs as above.      If you have any questions, please feel free to contact me:  Patti Clinton MD PGY-4  Nephrology Fellow  Pager [phone] / [employer] Teams (Preferred)  (After 5pm or on weekends please page the on-call fellow) To Whom it May Concern - Patient seen and evaluated in Emergency Room. Please excuse the above individual for medical care and recovery until date above. Thank you for you care. - Adia Rayo MD.

## 2024-12-19 NOTE — ED PROVIDER NOTE - CONSTITUTIONAL NEGATIVE STATEMENT, MLM
[facility]  [facility] Geriatrics Medicine    DOS: 12/19/2024      Subjective:  Chief Complaint:    Chief Complaint   Patient presents with    Follow-up     Followup for HTN/DM, no complaints today         History of Present Illness:  Gladys Dominguez is a 82 y.o. female with a PMH of HTN, HLD, depression, DM, insomnia, CAD (MI s/p PCTA 8/2020 on ASA), hx of PAF (after COVID, completed Eliquis).  Who presents today for: follow up of chronic conditions    Right posterior hip/buttock pain  - Intermittent chronic pain  - Improved with heating pad and tylenol  - Was shown home exercises to improve pain as thought to likely be muscle strain or spasm  - Pain resolved    Chronic back pain  - Relieved with tylenol and heating pads  - Stable pain at this time    HTN  - BP at goal today, 128/78  - Does not check ambulatory blood pressures, encouraged checking    DM  - A1C persistently below 6 for past 2 years, does not need DM screening tests at this time  - Last A1C 5.6 on 6/17/2024    Health maintenance reviewed   - mammogram - patient due, shared decision making was had with patient and patient would like to stop mammogram screenings  - colonoscopy - Will refer to colonoscopy here at [facility], had polyps at last procedure, likely will not need further testing after this as age 82 as long as benign polyps.     - Dexa scan - done 10/10/24, within normal limits    Geriatric assessment:  No recent falls  Appetite good  Sleep is good  Urine and bowel function intact  Does not drive    Social History     Socioeconomic History    Marital status:     Number of children: 3   Occupational History    Occupation: retired   Tobacco Use    Smoking status: Never     Passive exposure: Never    Smokeless tobacco: Never   Substance and Sexual Activity    Alcohol use: Never    Drug use: Never    Sexual activity: Not Currently     Partners: Male     Social Drivers of Health     Financial Resource Strain: Low  Risk  (12/6/2023)    Overall Financial Resource Strain (CARDIA)     Difficulty of Paying Living Expenses: Not hard at all   Food Insecurity: No Food Insecurity (12/6/2023)    Hunger Vital Sign     Worried About Running Out of Food in the Last Year: Never true     Ran Out of Food in the Last Year: Never true   Transportation Needs: No Transportation Needs (3/6/2024)    PRAPARE - Transportation     Lack of Transportation (Medical): No     Lack of Transportation (Non-Medical): No   Physical Activity: Inactive (12/6/2023)    Exercise Vital Sign     Days of Exercise per Week: 0 days     Minutes of Exercise per Session: 0 min   Stress: No Stress Concern Present (12/6/2023)    [employer] [place] [employer] - Occupational Stress Questionnaire     Feeling of Stress : Not at all   Housing Stability: Low Risk  (12/6/2023)    Housing Stability Vital Sign     Unable to Pay for Housing in the Last Year: No     Number of Places Lived in the Last Year: 1     Unstable Housing in the Last Year: No        Review of patient's allergies indicates:  No Known Allergies     Current Outpatient Medications:     aspirin (ECOTRIN) 81 MG EC tablet, Take 81 mg by mouth once daily., Disp: , Rfl:     atorvastatin (LIPITOR) 80 MG tablet, Take 1 tablet (80 mg total) by mouth every evening., Disp: 90 tablet, Rfl: 1    benazepriL (LOTENSIN) 40 MG tablet, Take 1 tablet (40 mg total) by mouth once daily., Disp: 90 tablet, Rfl: 1    citalopram (CELEXA) 20 MG tablet, Take 1 tablet (20 mg total) by mouth once daily., Disp: 90 tablet, Rfl: 1    dorzolamide-timolol 2-0.5% (COSOPT) 22.3-6.8 mg/mL ophthalmic solution, Place 1 drop into the left eye 2 (two) times daily., Disp: , Rfl:     ezetimibe (ZETIA) 10 mg tablet, Take 1 tablet (10 mg total) by mouth once daily., Disp: 90 tablet, Rfl: 1    mirtazapine (REMERON) 15 MG tablet, Take 1 tablet (15 mg total) by mouth nightly as needed., Disp: 90 tablet, Rfl: 1    nitroGLYCERIN (NITROSTAT) 0.4 MG SL  "tablet, Place 0.4 mg under the tongue every 5 (five) minutes as needed for Chest pain., Disp: , Rfl:   No current facility-administered medications for this visit.     Review of Systems:  ROS as per HPI    Objective:   Vitals:    12/19/24 1001 12/19/24 1004   BP: (!) 166/78 128/78   BP Location: Right arm Right arm   Patient Position: Sitting Sitting   Pulse: 65    Temp: 98.4 °F (36.9 °C)    TempSrc: Oral    SpO2: 100%    Weight: 79.7 kg (175 lb 11.3 oz)    Height: 5' 5" (1.651 m)        Physical Exam  Constitutional:       General: She is not in acute distress.     Appearance: Normal appearance. She is not ill-appearing.   HENT:      Head: Normocephalic and atraumatic.   Eyes:      General: No scleral icterus.  Cardiovascular:      Rate and Rhythm: Normal rate and regular rhythm.      Pulses: Normal pulses.      Heart sounds: Normal heart sounds.   Pulmonary:      Effort: Pulmonary effort is normal.      Breath sounds: Normal breath sounds. No stridor. No wheezing.   Musculoskeletal:         General: Normal range of motion.      Cervical back: Normal range of motion and neck supple.   Skin:     General: Skin is warm.      Coloration: Skin is not jaundiced.   Neurological:      Mental Status: She is alert.             Recent labs:  CBC:  Lab Results   Component Value Date    WBC 5.70 09/23/2024    RBC 3.41 (L) 09/23/2024    HGB 9.4 (L) 09/23/2024    HCT 27.9 (L) 09/23/2024    MCV 81.8 09/23/2024    MCH 27.6 09/23/2024    MCHC 33.7 09/23/2024    RDW 17.0 09/23/2024     09/23/2024    MPV 10.0 09/23/2024      CMP:  Sodium   Date Value Ref Range Status   09/23/2024 138 136 - 145 mmol/L Final   08/08/2020 137 136 - 145 mmol/L Final     Potassium   Date Value Ref Range Status   09/23/2024 4.0 3.5 - 5.1 mmol/L Final   08/08/2020 4.4 3.5 - 5.1 mmol/L Final     Chloride   Date Value Ref Range Status   09/23/2024 103 98 - 107 mmol/L Final   08/08/2020 104 100 - 109 mmol/L Final     CO2   Date Value Ref Range Status " "  09/23/2024 27 23 - 31 mmol/L Final     Carbon Dioxide   Date Value Ref Range Status   08/08/2020 24 22 - 33 mmol/L Final     Blood Urea Nitrogen   Date Value Ref Range Status   09/23/2024 20.8 (H) 9.8 - 20.1 mg/dL Final   08/08/2020 22 5 - 25 mg/dL Final     Creatinine   Date Value Ref Range Status   09/23/2024 1.07 (H) 0.55 - 1.02 mg/dL Final   08/08/2020 0.70 0.57 - 1.25 mg/dL Final     Calcium   Date Value Ref Range Status   09/23/2024 10.0 8.4 - 10.2 mg/dL Final   08/08/2020 8.6 (L) 8.8 - 10.6 mg/dL Final     Albumin   Date Value Ref Range Status   09/23/2024 3.9 3.4 - 4.8 g/dL Final     Bilirubin Total   Date Value Ref Range Status   09/23/2024 0.6 <=1.5 mg/dL Final     ALP   Date Value Ref Range Status   09/23/2024 42 40 - 150 unit/L Final     AST   Date Value Ref Range Status   09/23/2024 21 5 - 34 unit/L Final     ALT   Date Value Ref Range Status   09/23/2024 21 0 - 55 unit/L Final     Anion Gap   Date Value Ref Range Status   08/08/2020 9 8 - 16 mmol/L Final     eGFR    Date Value Ref Range Status   08/08/2020 99 >=60 mL/min/1.73mSq Final      BMP:  Lab Results   Component Value Date     09/23/2024    K 4.0 09/23/2024     09/23/2024    CO2 27 09/23/2024    BUN 20.8 (H) 09/23/2024    CREATININE 1.07 (H) 09/23/2024    CALCIUM 10.0 09/23/2024    ANIONGAP 9 08/08/2020    ESTGFRAFRICA 99 08/08/2020      Lipid Panel:  Lab Results   Component Value Date    CHOL 143 06/17/2024    CHOL 153 03/06/2024    CHOL 234 (H) 12/06/2023     Lab Results   Component Value Date    HDL 46 06/17/2024    HDL 50 03/06/2024    HDL 42 12/06/2023     No results found for: "LDLCALC"  Lab Results   Component Value Date    TRIG 101 06/17/2024    TRIG 113 03/06/2024    TRIG 151 (H) 12/06/2023     No results found for: "CHOLHDL"   HbA1c:  Lab Results   Component Value Date    HGBA1C 5.6 06/17/2024      TSH:  Lab Results   Component Value Date    TSH 1.854 03/15/2023     Assessment & Plan:    Gladys Dominguez is " presenting as above and will be treated as follows:  Gladys was seen today for follow-up.    Diagnoses and all orders for this visit:    Type 2 diabetes mellitus without complication, without long-term current use of insulin  -     POCT HEMOGLOBIN A1C    Essential hypertension    Colon cancer screening  -     Ambulatory referral/consult to Gastroenterology; Future    Influenza vaccination given  -     influenza (adjuvanted) (Fluad) 45 mcg/0.5 mL IM vaccine (> or = 64 yo) 0.5 mL        81 yo F presenting for follow up chronic conditions    - poc A1C today, A1C well controlled for past 2 years  - Consider Vitamin D and thyroid function tests at next visit, Last Vit D 35.9 on 3/15/23, Last TSH 1.854 on 3/15/23  - Flu vaccine administered today  - Will refer to GI here for colonoscopy given polyps on last colonoscopy, likely patient's last colonoscopy for screening purposes pending results  - reports mood is good, on Celexa and Remeron prn    RTC in 3 months with PCP    Giovanni Reyes MD  [facility] HO-2             no fever and no chills.

## 2025-05-19 ENCOUNTER — EMERGENCY (EMERGENCY)
Facility: HOSPITAL | Age: 30
LOS: 0 days | Discharge: HOME | End: 2025-05-20
Attending: EMERGENCY MEDICINE
Payer: MEDICAID

## 2025-05-19 VITALS
TEMPERATURE: 99 F | SYSTOLIC BLOOD PRESSURE: 121 MMHG | HEIGHT: 63 IN | DIASTOLIC BLOOD PRESSURE: 76 MMHG | HEART RATE: 93 BPM | OXYGEN SATURATION: 99 % | WEIGHT: 179.9 LBS | RESPIRATION RATE: 19 BRPM

## 2025-05-19 DIAGNOSIS — Z98.890 OTHER SPECIFIED POSTPROCEDURAL STATES: ICD-10-CM

## 2025-05-19 DIAGNOSIS — L03.115 CELLULITIS OF RIGHT LOWER LIMB: ICD-10-CM

## 2025-05-19 DIAGNOSIS — M25.451 EFFUSION, RIGHT HIP: ICD-10-CM

## 2025-05-19 DIAGNOSIS — M25.551 PAIN IN RIGHT HIP: ICD-10-CM

## 2025-05-19 DIAGNOSIS — M62.89 OTHER SPECIFIED DISORDERS OF MUSCLE: ICD-10-CM

## 2025-05-19 LAB
ALBUMIN SERPL ELPH-MCNC: 2.9 G/DL — LOW (ref 3.3–5)
ALP SERPL-CCNC: 76 U/L — SIGNIFICANT CHANGE UP (ref 40–120)
ALT FLD-CCNC: 12 U/L — SIGNIFICANT CHANGE UP (ref 12–78)
ANION GAP SERPL CALC-SCNC: 6 MMOL/L — SIGNIFICANT CHANGE UP (ref 5–17)
AST SERPL-CCNC: 13 U/L — LOW (ref 15–37)
BILIRUB SERPL-MCNC: 0.8 MG/DL — SIGNIFICANT CHANGE UP (ref 0.2–1.2)
BUN SERPL-MCNC: 7 MG/DL — SIGNIFICANT CHANGE UP (ref 7–23)
CALCIUM SERPL-MCNC: 8.8 MG/DL — SIGNIFICANT CHANGE UP (ref 8.5–10.1)
CHLORIDE SERPL-SCNC: 106 MMOL/L — SIGNIFICANT CHANGE UP (ref 96–108)
CO2 SERPL-SCNC: 25 MMOL/L — SIGNIFICANT CHANGE UP (ref 22–31)
CREAT SERPL-MCNC: 0.58 MG/DL — SIGNIFICANT CHANGE UP (ref 0.5–1.3)
EGFR: 127 ML/MIN/1.73M2 — SIGNIFICANT CHANGE UP
EGFR: 127 ML/MIN/1.73M2 — SIGNIFICANT CHANGE UP
GLUCOSE SERPL-MCNC: 97 MG/DL — SIGNIFICANT CHANGE UP (ref 70–99)
HCG SERPL-ACNC: <1 MIU/ML — SIGNIFICANT CHANGE UP
HCT VFR BLD CALC: 35.9 % — SIGNIFICANT CHANGE UP (ref 34.5–45)
HGB BLD-MCNC: 12.5 G/DL — SIGNIFICANT CHANGE UP (ref 11.5–15.5)
MCHC RBC-ENTMCNC: 33.2 PG — SIGNIFICANT CHANGE UP (ref 27–34)
MCHC RBC-ENTMCNC: 34.8 G/DL — SIGNIFICANT CHANGE UP (ref 32–36)
MCV RBC AUTO: 95.2 FL — SIGNIFICANT CHANGE UP (ref 80–100)
PLATELET # BLD AUTO: 432 K/UL — HIGH (ref 150–400)
POTASSIUM SERPL-MCNC: 4.4 MMOL/L — SIGNIFICANT CHANGE UP (ref 3.5–5.3)
POTASSIUM SERPL-SCNC: 4.4 MMOL/L — SIGNIFICANT CHANGE UP (ref 3.5–5.3)
PROT SERPL-MCNC: 7.4 GM/DL — SIGNIFICANT CHANGE UP (ref 6–8.3)
RBC # BLD: 3.77 M/UL — LOW (ref 3.8–5.2)
RBC # FLD: 13.1 % — SIGNIFICANT CHANGE UP (ref 10.3–14.5)
SODIUM SERPL-SCNC: 137 MMOL/L — SIGNIFICANT CHANGE UP (ref 135–145)
WBC # BLD: 12.84 K/UL — HIGH (ref 3.8–10.5)
WBC # FLD AUTO: 12.84 K/UL — HIGH (ref 3.8–10.5)

## 2025-05-19 PROCEDURE — 99285 EMERGENCY DEPT VISIT HI MDM: CPT

## 2025-05-19 RX ORDER — KETOROLAC TROMETHAMINE 30 MG/ML
15 INJECTION, SOLUTION INTRAMUSCULAR; INTRAVENOUS ONCE
Refills: 0 | Status: DISCONTINUED | OUTPATIENT
Start: 2025-05-19 | End: 2025-05-19

## 2025-05-19 RX ADMIN — KETOROLAC TROMETHAMINE 15 MILLIGRAM(S): 30 INJECTION, SOLUTION INTRAMUSCULAR; INTRAVENOUS at 23:32

## 2025-05-19 NOTE — ED ADULT NURSE NOTE - OBJECTIVE STATEMENT
29Y A&OX3 F c/o bump to R upper buttock x 3 days s/p lipo transfer and buttock augmentation in [place] (04/07). pt states has tactile fever and slight discomfort to R buttock. denies discharge. no pain medication taken

## 2025-05-19 NOTE — ED ADULT NURSE NOTE - NSFALLUNIVINTERV_ED_ALL_ED
Bed/Stretcher in lowest position, wheels locked, appropriate side rails in place/Call bell, personal items and telephone in reach/Instruct patient to call for assistance before getting out of bed/chair/stretcher/Non-slip footwear applied when patient is off stretcher/[place] to call system/Physically safe environment - no spills, clutter or unnecessary equipment/Purposeful proactive rounding/Room/bathroom lighting operational, light cord in reach

## 2025-05-19 NOTE — ED ADULT NURSE NOTE - NS ED NURSE LEVEL OF CONSCIOUSNESS ORIENTATION
[address] [facility] Out[facility] Physical [facility]   [facility]   Phone: [phone]   Fax: [phone]     Physical Therapy Daily Treatment Note    Date:  2021  Patient Name:  Mayelin Marroquin    :  1949 MRN: [id number]  Physician: Janell Elliott DO                    Medical Diagnosis: Cervical spinal fusion ( Z98.1)  Clinical Diagnosis: Neck pain with mobility deficits   Onset date: 2021       Next Dr's appt.: TBD  PT Visit Information  PT Insurance Information: [employer] Medicare - unlimited/based on medical necessity $40.00 co-pay  Total # of Visits Approved: 18  Total # of Visits to Date: 12  No Show: 0  Canceled Appointment: 0     Subjective  Patient stated that she felt that her last treatment session was successful. Slight soreness reported and she reported that she tried to practice cervical side bending to the (L) and (R) but it is still difficult. Pain  Pain:  [x] Yes   [] No               Location: (R) posterior neck region/base of the skull to the base of the neck without radiculopathy   Pain Rating: (0-10 scale) 0/10   Worst: 4-5/10   Best: 0/10   Symptoms:  [] Improving   [] Worsening                 [x] Same  Descriptors:  \"Intermittent\" aching   Aggravating factors: ADLs that involve the cervical motion(s)/UEs   Relieving factors: Rest/Repositioning  Sleep: Disturbed   Other:     Objective:  Modalities: Hot pack with passive stretching   Precautions:  Exercises:  Exercise Reps/ Time Weight/ Level Comments             Seated          Cervical Flexion/Extension 10 reps        Thoracic Extension  10 reps        Scapular Retraction  10 reps        Pelvic Rocks  10 reps        (R) cervical side bend  10 reps       (L) cervical rotation  10 reps        \"Isometrics\" cervical motions 10 reps    Flexion, Extension, (R) side bend, (L) side       Passive Stretching   Supine Cervical Side Bend(s) with scapular depression to the opposite side 30 sec hold x 2 reps with each
Oriented - self; Oriented - place; Oriented - time

## 2025-05-19 NOTE — ED ADULT TRIAGE NOTE - CHIEF COMPLAINT QUOTE
Pt s/p buttock augmentation and lipo transfer to hips  04/07 in   reports bump to R upper buttock 3 days that has increased in size. denies fever and discharge

## 2025-05-20 VITALS
RESPIRATION RATE: 19 BRPM | SYSTOLIC BLOOD PRESSURE: 102 MMHG | DIASTOLIC BLOOD PRESSURE: 68 MMHG | OXYGEN SATURATION: 99 % | HEART RATE: 65 BPM | TEMPERATURE: 99 F

## 2025-05-20 LAB
BASOPHILS # BLD AUTO: 0 K/UL — SIGNIFICANT CHANGE UP (ref 0–0.2)
BASOPHILS NFR BLD AUTO: 0 % — SIGNIFICANT CHANGE UP (ref 0–2)
EOSINOPHIL # BLD AUTO: 0.26 K/UL — SIGNIFICANT CHANGE UP (ref 0–0.5)
EOSINOPHIL NFR BLD AUTO: 2 % — SIGNIFICANT CHANGE UP (ref 0–6)
LYMPHOCYTES # BLD AUTO: 2.57 K/UL — SIGNIFICANT CHANGE UP (ref 1–3.3)
LYMPHOCYTES # BLD AUTO: 20 % — SIGNIFICANT CHANGE UP (ref 13–44)
MANUAL SMEAR VERIFICATION: SIGNIFICANT CHANGE UP
MONOCYTES # BLD AUTO: 0.9 K/UL — SIGNIFICANT CHANGE UP (ref 0–0.9)
MONOCYTES NFR BLD AUTO: 7 % — SIGNIFICANT CHANGE UP (ref 2–14)
NEUTROPHILS # BLD AUTO: 9.12 K/UL — HIGH (ref 1.8–7.4)
NEUTROPHILS NFR BLD AUTO: 71 % — SIGNIFICANT CHANGE UP (ref 43–77)
NRBC # BLD: 0 /100 WBCS — SIGNIFICANT CHANGE UP (ref 0–0)
NRBC BLD AUTO-RTO: SIGNIFICANT CHANGE UP /100 WBCS (ref 0–0)
NRBC BLD-RTO: 0 /100 WBCS — SIGNIFICANT CHANGE UP (ref 0–0)
PLAT MORPH BLD: NORMAL — SIGNIFICANT CHANGE UP
RBC BLD AUTO: NORMAL — SIGNIFICANT CHANGE UP
SMUDGE CELLS # BLD: PRESENT — SIGNIFICANT CHANGE UP

## 2025-05-20 PROCEDURE — 74177 CT ABD & PELVIS W/CONTRAST: CPT | Mod: 26

## 2025-05-20 RX ORDER — CEPHALEXIN 250 MG/1
500 CAPSULE ORAL ONCE
Refills: 0 | Status: COMPLETED | OUTPATIENT
Start: 2025-05-20 | End: 2025-05-20

## 2025-05-20 RX ORDER — IBUPROFEN 200 MG
1 TABLET ORAL
Qty: 20 | Refills: 0
Start: 2025-05-20 | End: 2025-05-24

## 2025-05-20 RX ORDER — CEPHALEXIN 250 MG/1
1 CAPSULE ORAL
Qty: 20 | Refills: 0
Start: 2025-05-20 | End: 2025-05-24

## 2025-05-20 RX ORDER — TRAMADOL HYDROCHLORIDE 50 MG/1
1 TABLET, FILM COATED ORAL
Qty: 9 | Refills: 0
Start: 2025-05-20 | End: 2025-05-22

## 2025-05-20 RX ORDER — TRAMADOL HYDROCHLORIDE 50 MG/1
50 TABLET, FILM COATED ORAL ONCE
Refills: 0 | Status: DISCONTINUED | OUTPATIENT
Start: 2025-05-20 | End: 2025-05-20

## 2025-05-20 RX ADMIN — CEPHALEXIN 500 MILLIGRAM(S): 250 CAPSULE ORAL at 03:14

## 2025-05-20 RX ADMIN — TRAMADOL HYDROCHLORIDE 50 MILLIGRAM(S): 50 TABLET, FILM COATED ORAL at 03:14

## 2025-05-20 NOTE — ED PROVIDER NOTE - OBJECTIVE STATEMENT
This patient is a 29 year old woman who presents to the ER c/o swelling and pain to right hip.  She denies fall.  She had buttock augmentation and lipo transfer to hips 6 weeks ago in the [place].  She noticed the swelling 3 days ago and it has been increasing in size.  No fever.

## 2025-05-20 NOTE — ED PROVIDER NOTE - NSFOLLOWUPCLINICS_GEN_ALL_ED_FT
[facility]  Plastic & Reconstructive Surgery  [address] [place] [address]  Phone: [phone]  Fax:     [facility] [place]  Plastic Surgery  [address]  [place] [address]  Phone: [phone]  Fax:

## 2025-05-20 NOTE — ED PROVIDER NOTE - CLINICAL SUMMARY MEDICAL DECISION MAKING FREE TEXT BOX
This patient is a 29 year old woman who presents to the ER c/o swelling and pain to right hip.  She denies fall.  She had buttock augmentation and lipo transfer to hips 6 weeks ago in the [place].  She noticed the swelling 3 days ago and it has been increasing in size.  No fever.    Patient in no acute distress, vitals stable, right hip swelling, firm with tenderness and overlying erythema.  Normal range of motion at hip.  Low suspicion for hematoma consider abscess.  Normal range of motion at hip and patient ambulatory therefore unlikely septic arthritis.  Will check labs, give medication for pain and CT. This patient is a 29 year old woman who presents to the ER c/o swelling and pain to right hip.  She denies fall.  She had buttock augmentation and lipo transfer to hips 6 weeks ago in the [place].  She noticed the swelling 3 days ago and it has been increasing in size.  No fever.    Patient in no acute distress, vitals stable, right hip swelling, firm with tenderness and overlying erythema.  Normal range of motion at hip.  Low suspicion for hematoma consider abscess.  Normal range of motion at hip and patient ambulatory therefore unlikely septic arthritis.  Will check labs, give medication for pain and CT.    CT shows fat necrosis at right hip.  Supportive care and plastics follow-up discussed.

## 2025-05-20 NOTE — ED PROVIDER NOTE - NSFOLLOWUPINSTRUCTIONS_ED_ALL_ED_FT
1) Take tylenol for pain  2) Take prescription medication as instructed  3) Follow-up with plastic surgery  4) Follow up with your primary care doctor  5) Return to the ER for worsening or concerning symptoms

## 2025-05-20 NOTE — ED PROVIDER NOTE - PATIENT PORTAL LINK FT
You can access the FollowMyHealth Patient Portal offered by [facility] by registering at the following website: [email or web address]. By joining [employer]’s FollowMyHealth portal, you will also be able to view your health information using other applications (apps) compatible with our system.

## 2025-05-20 NOTE — ED PROVIDER NOTE - CARE PROVIDER_API CALL
Kvng Zimmerman  Plastic Surgery  [address] [place] [address], [address]  [place] [address]  Phone: [phone]  Fax: [phone]  Follow Up Time: